# Patient Record
Sex: MALE | Race: WHITE
[De-identification: names, ages, dates, MRNs, and addresses within clinical notes are randomized per-mention and may not be internally consistent; named-entity substitution may affect disease eponyms.]

---

## 2017-10-06 ENCOUNTER — HOSPITAL ENCOUNTER (OUTPATIENT)
Dept: HOSPITAL 6 - LAB | Age: 77
End: 2017-10-06
Attending: NURSE PRACTITIONER
Payer: MEDICARE

## 2017-10-06 DIAGNOSIS — N30.01: Primary | ICD-10-CM

## 2017-10-07 ENCOUNTER — HOSPITAL ENCOUNTER (OUTPATIENT)
Dept: HOSPITAL 6 - AMSURD | Age: 77
LOS: 6 days | Discharge: HOME | End: 2017-10-13
Attending: NURSE PRACTITIONER
Payer: MEDICARE

## 2017-10-07 VITALS — WEIGHT: 295.64 LBS | BODY MASS INDEX: 44.81 KG/M2 | HEIGHT: 67.99 IN

## 2017-10-07 VITALS — SYSTOLIC BLOOD PRESSURE: 143 MMHG | DIASTOLIC BLOOD PRESSURE: 60 MMHG

## 2017-10-07 DIAGNOSIS — N30.91: Primary | ICD-10-CM

## 2017-10-08 VITALS — DIASTOLIC BLOOD PRESSURE: 81 MMHG | SYSTOLIC BLOOD PRESSURE: 166 MMHG

## 2017-10-09 VITALS — SYSTOLIC BLOOD PRESSURE: 137 MMHG | DIASTOLIC BLOOD PRESSURE: 76 MMHG

## 2017-10-10 VITALS — DIASTOLIC BLOOD PRESSURE: 67 MMHG | SYSTOLIC BLOOD PRESSURE: 151 MMHG

## 2017-10-11 VITALS — SYSTOLIC BLOOD PRESSURE: 148 MMHG | DIASTOLIC BLOOD PRESSURE: 80 MMHG

## 2017-10-12 VITALS — DIASTOLIC BLOOD PRESSURE: 67 MMHG | SYSTOLIC BLOOD PRESSURE: 130 MMHG

## 2017-10-13 ENCOUNTER — HOSPITAL ENCOUNTER (OUTPATIENT)
Dept: HOSPITAL 6 - LAB | Age: 77
End: 2017-10-13
Attending: PHYSICIAN ASSISTANT
Payer: MEDICARE

## 2017-10-13 VITALS
DIASTOLIC BLOOD PRESSURE: 60 MMHG | DIASTOLIC BLOOD PRESSURE: 60 MMHG | DIASTOLIC BLOOD PRESSURE: 60 MMHG | SYSTOLIC BLOOD PRESSURE: 148 MMHG | SYSTOLIC BLOOD PRESSURE: 148 MMHG | SYSTOLIC BLOOD PRESSURE: 148 MMHG

## 2017-10-13 VITALS
DIASTOLIC BLOOD PRESSURE: 56 MMHG | SYSTOLIC BLOOD PRESSURE: 122 MMHG | SYSTOLIC BLOOD PRESSURE: 122 MMHG | DIASTOLIC BLOOD PRESSURE: 56 MMHG

## 2017-10-13 DIAGNOSIS — N30.90: Primary | ICD-10-CM

## 2017-10-13 NOTE — NUR
At 1640 Pt here for out patient IM rocephin 2gm. Oxygen on room air 85-86% on
room air. Pt states he has oxygen he wears 3L/NC. Oxygen applied to pt at
3L/NC. Stated he felt slightly SOB. Sa02 increased to 92-95%. Walks with
walker, gait steady. A/o x 3.
1700 Pt and wife asked if he had a follow appt with Ghazala KELSEY. Clinic call. Order received for UA and office will call pt on monday
with results and for any follow up appt.
1709 UA received, sent to lab.

## 2017-11-03 ENCOUNTER — HOSPITAL ENCOUNTER (OUTPATIENT)
Dept: HOSPITAL 6 - RAD | Age: 77
End: 2017-11-03
Attending: FAMILY MEDICINE
Payer: MEDICARE

## 2017-11-03 DIAGNOSIS — I27.20: ICD-10-CM

## 2017-11-03 DIAGNOSIS — Z47.89: ICD-10-CM

## 2017-11-03 DIAGNOSIS — I10: ICD-10-CM

## 2017-11-03 DIAGNOSIS — Z87.81: ICD-10-CM

## 2017-11-03 DIAGNOSIS — I35.8: ICD-10-CM

## 2017-11-03 DIAGNOSIS — N31.9: Primary | ICD-10-CM

## 2017-11-03 DIAGNOSIS — E11.40: ICD-10-CM

## 2017-11-03 DIAGNOSIS — J44.9: ICD-10-CM

## 2017-11-03 DIAGNOSIS — M25.572: ICD-10-CM

## 2017-11-03 DIAGNOSIS — R93.7: ICD-10-CM

## 2017-11-03 LAB
APPEARANCE UR: CLEAR
BILIRUB UR QL STRIP.AUTO: NEGATIVE
COLOR UR AUTO: YELLOW
GLUCOSE UR QL STRIP.AUTO: NEGATIVE MG/DL
KETONES UR QL STRIP.AUTO: NEGATIVE
LEUKOCYTE ESTERASE UR QL STRIP: NEGATIVE
NITRITE UR QL STRIP: NEGATIVE
PH UR STRIP.AUTO: 5 [PH] (ref 5–8)
PROT ?TM UR-MCNC: NEGATIVE MG/DL
RBC UR QL AUTO: NEGATIVE
SP GR UR STRIP.AUTO: 1.01 (ref 1–1.03)
UROBILINOGEN UR-MCNC: NORMAL MG/DL
WBC #/AREA URNS HPF: (no result) /HPF (ref 0–3)

## 2017-12-14 ENCOUNTER — HOSPITAL ENCOUNTER (OUTPATIENT)
Dept: HOSPITAL 6 - LAB | Age: 77
End: 2017-12-14
Payer: MEDICARE

## 2017-12-14 DIAGNOSIS — M25.572: Primary | ICD-10-CM

## 2017-12-14 LAB
BASOPHILS # BLD: 0.1 10*3/UL (ref 0.02–0.1)
EOSINOPHIL # BLD: 0.3 10*3/UL (ref 0.04–0.4)
EOSINOPHIL NFR BLD: 3.8 % (ref 0–4)
ERYTHROCYTE [DISTWIDTH] IN BLOOD BY AUTOMATED COUNT: 15 % (ref 11.5–14.5)
ERYTHROCYTE [SEDIMENTATION RATE] IN BLOOD: 23 MM/HR (ref 0–20)
HCT VFR BLD AUTO: 44.6 % (ref 42–52)
HGB BLD-MCNC: 13.8 G/DL (ref 13.5–18)
LYMPHOCYTES # BLD: 2.1 10*3/UL (ref 1.5–4)
MCH RBC QN AUTO: 31 PG (ref 27–31)
MCHC RBC AUTO-ENTMCNC: 31 G/DL (ref 33–37)
MCV RBC AUTO: 99 FL (ref 78–100)
MONOCYTES # BLD: 0.6 10*3/UL (ref 0.2–0.8)
NEUTROPHILS # BLD: 3.5 10*3/UL (ref 1.4–6.5)
PLATELET # BLD AUTO: 219 K/MM3 (ref 130–400)
PMV BLD AUTO: 9.7 FL (ref 7.4–10.4)
RBC # BLD AUTO: 4.53 M/MM3 (ref 4.2–5.6)
WBC # BLD AUTO: 6.6 K/MM3 (ref 4.8–10.8)

## 2018-01-04 ENCOUNTER — HOSPITAL ENCOUNTER (OUTPATIENT)
Dept: HOSPITAL 6 - ED | Age: 78
Setting detail: OBSERVATION
LOS: 2 days | Discharge: HOME HEALTH | End: 2018-01-06
Attending: FAMILY MEDICINE | Admitting: FAMILY MEDICINE
Payer: MEDICARE

## 2018-01-04 VITALS — SYSTOLIC BLOOD PRESSURE: 141 MMHG | DIASTOLIC BLOOD PRESSURE: 80 MMHG

## 2018-01-04 VITALS — DIASTOLIC BLOOD PRESSURE: 71 MMHG | SYSTOLIC BLOOD PRESSURE: 132 MMHG

## 2018-01-04 VITALS — HEIGHT: 67.99 IN | WEIGHT: 287.92 LBS | BODY MASS INDEX: 43.64 KG/M2

## 2018-01-04 DIAGNOSIS — E11.40: ICD-10-CM

## 2018-01-04 DIAGNOSIS — Z79.01: ICD-10-CM

## 2018-01-04 DIAGNOSIS — E11.22: ICD-10-CM

## 2018-01-04 DIAGNOSIS — Z88.8: ICD-10-CM

## 2018-01-04 DIAGNOSIS — G47.30: ICD-10-CM

## 2018-01-04 DIAGNOSIS — R01.1: ICD-10-CM

## 2018-01-04 DIAGNOSIS — I27.20: ICD-10-CM

## 2018-01-04 DIAGNOSIS — E66.01: ICD-10-CM

## 2018-01-04 DIAGNOSIS — Z79.82: ICD-10-CM

## 2018-01-04 DIAGNOSIS — Z88.5: ICD-10-CM

## 2018-01-04 DIAGNOSIS — Z91.81: ICD-10-CM

## 2018-01-04 DIAGNOSIS — I12.9: ICD-10-CM

## 2018-01-04 DIAGNOSIS — N17.9: Primary | ICD-10-CM

## 2018-01-04 DIAGNOSIS — R09.02: ICD-10-CM

## 2018-01-04 DIAGNOSIS — N18.9: ICD-10-CM

## 2018-01-04 DIAGNOSIS — Z99.81: ICD-10-CM

## 2018-01-04 DIAGNOSIS — E11.649: ICD-10-CM

## 2018-01-04 DIAGNOSIS — Z79.84: ICD-10-CM

## 2018-01-04 DIAGNOSIS — Z79.02: ICD-10-CM

## 2018-01-04 DIAGNOSIS — I35.0: ICD-10-CM

## 2018-01-04 DIAGNOSIS — N31.9: ICD-10-CM

## 2018-01-04 DIAGNOSIS — T38.3X5A: ICD-10-CM

## 2018-01-04 DIAGNOSIS — N39.498: ICD-10-CM

## 2018-01-04 DIAGNOSIS — J44.9: ICD-10-CM

## 2018-01-04 DIAGNOSIS — N13.9: ICD-10-CM

## 2018-01-04 DIAGNOSIS — Z87.891: ICD-10-CM

## 2018-01-04 LAB
ALBUMIN SERPL-MCNC: 3.3 G/DL (ref 3.5–5)
ALT SERPL-CCNC: 32 U/L (ref 21–72)
APPEARANCE UR: CLEAR
AST SERPL-CCNC: 21 U/L (ref 17–59)
BASOPHILS # BLD: 0 10*3/UL (ref 0.02–0.1)
BILIRUB SERPL-MCNC: 0.8 MG/DL (ref 0.2–1.3)
BILIRUB UR QL STRIP.AUTO: NEGATIVE
BUN/CREAT SERPL: 18 (ref 6–26)
CALCIUM SERPL-MCNC: 9.1 MG/DL (ref 8.4–10.2)
CO2 SERPL-SCNC: 29 MMOL/L (ref 22–30)
COLOR UR AUTO: YELLOW
EOSINOPHIL # BLD: 0.3 10*3/UL (ref 0.04–0.4)
EOSINOPHIL NFR BLD: 3.8 % (ref 0–4)
ERYTHROCYTE [DISTWIDTH] IN BLOOD BY AUTOMATED COUNT: 14.9 % (ref 11.5–14.5)
GLUCOSE SERPL-MCNC: 49 MG/DL (ref 75–110)
GLUCOSE UR QL STRIP.AUTO: NEGATIVE MG/DL
HCT VFR BLD AUTO: 41.1 % (ref 42–52)
HGB BLD-MCNC: 12.5 G/DL (ref 13.5–18)
KETONES UR QL STRIP.AUTO: NEGATIVE
LEUKOCYTE ESTERASE UR QL STRIP: NEGATIVE
LYMPHOCYTES # BLD: 1.7 10*3/UL (ref 1.5–4)
MCH RBC QN AUTO: 30 PG (ref 27–31)
MCHC RBC AUTO-ENTMCNC: 30 G/DL (ref 33–37)
MCV RBC AUTO: 98 FL (ref 78–100)
MONOCYTES # BLD: 0.7 10*3/UL (ref 0.2–0.8)
NEUTROPHILS # BLD: 4 10*3/UL (ref 1.4–6.5)
NITRITE UR QL STRIP: NEGATIVE
PH UR STRIP.AUTO: 5 [PH] (ref 5–8)
PLATELET # BLD AUTO: 200 K/MM3 (ref 130–400)
PMV BLD AUTO: 9.7 FL (ref 7.4–10.4)
POTASSIUM SERPL-SCNC: 5 MMOL/L (ref 3.6–5)
PROT ?TM UR-MCNC: (no result) MG/DL
PROT SERPL-MCNC: 6.4 G/DL (ref 6.3–8.2)
RBC # BLD AUTO: 4.18 M/MM3 (ref 4.2–5.6)
RBC UR QL AUTO: NEGATIVE
SODIUM SERPL-SCNC: 140 MMOL/L (ref 137–145)
SP GR UR STRIP.AUTO: 1.01 (ref 1–1.03)
UROBILINOGEN UR-MCNC: NORMAL MG/DL
WBC # BLD AUTO: 6.7 K/MM3 (ref 4.8–10.8)
WBC #/AREA URNS HPF: (no result) /HPF (ref 0–3)

## 2018-01-04 PROCEDURE — A4354 CATH INSERTION TRAY W/BAG: HCPCS

## 2018-01-04 PROCEDURE — G0378 HOSPITAL OBSERVATION PER HR: HCPCS

## 2018-01-04 NOTE — NUR
PATIENT ADMITTED FROM THE ER VIA WHEELCHAIR AT THIS TIME FOR GENERAL WEAKNESS
AND FALLS AT HOME, WAS HYPOGLYCEMIC IN THE ER WITH BLOOD SUGAR OF 49, WAS
GIVEN ORANGE JUICE AT THAT TIME, WIFE HAS BROUGHT IN HIS HOME MEDS AND SON IS
BRINGING HIM SOME FOOD AT THIS TIME FOR SUPPER, PATIENT IS ABLE TO TRANSFER
SELF FROM THE WHEELCHAIR INTO THE CHAIR WITH STANDBY ASSIST, PARTIAL
ASSESSMENT PERFORMED BEFORE PATIENTS FOOD ARRIVED, PATIENT ALERT AND ORIENTED,
RESP EVEN/UNLABORD, DENIES ANY PAIN, NAUSEA OR VOMITING, IS NOT LIGHTHEADED OR
DIZZY AT THIS TIME, TRAY SET UP FOR PATIENT TO ALLOW HIM TO EAT AT THIS TIME
AND WILL RESUME FULL ASSESSMENT IN A LITTLE WHILE, PATIENTS WIFE AND SON AT
BEDSIDE

## 2018-01-04 NOTE — NUR
PATIENT WAS ABLE TO EAT WITHOUT DIFFICULTY, IS CHANGED INTO A GOWN AND FULL
SKIN ASSESSMENT PERFORMED AT THIS TIME, TELEMETRY APPLIED, NORMAL SINUS
RHYTHM, 1+ EDEMA NOTED TO BILATERAL LOWER LEGS, HAS TREY HOSE ON, LUNGS CLEAR,
BOWEL SOUNDS ACTIVE, PULSES STRONG IN ALL 4 EXTREMITIES, HEART REGULAR,
PATIENT CLAIMS HE FEELS BETTER NOW THEN HE HAS IN DAYS, EDUCATION PROVIDED ON
S/S OF HYPOGLYCEMIA, THE NEED TO CHECK BLOOD SUGARS ESPECIALLY WHEN FEELING
LIGHTHEADED/WEAK OR DIZZY, PATIENT THINKS HE HAS A GLUCOMETER AT HOME BUT NOT
SURE IF HE HAS ANY STRIPS, WILL PASS THAT INFORMATION ON TO MAKE SURE HE HAS
ADEQUATE SUPPLIES BEFORE GOING HOME, EDUCATION PROVIDED ON NORMAL AND ABNORMAL
BLOOD SUGAR RANGES AND S/S OF ABNORMAL SUGARS, FURTHER EDUCATION WILL BE
NEEDED

## 2018-01-05 VITALS — SYSTOLIC BLOOD PRESSURE: 159 MMHG | DIASTOLIC BLOOD PRESSURE: 79 MMHG

## 2018-01-05 VITALS — SYSTOLIC BLOOD PRESSURE: 138 MMHG | DIASTOLIC BLOOD PRESSURE: 68 MMHG

## 2018-01-05 VITALS — SYSTOLIC BLOOD PRESSURE: 130 MMHG | DIASTOLIC BLOOD PRESSURE: 60 MMHG

## 2018-01-05 VITALS — SYSTOLIC BLOOD PRESSURE: 155 MMHG | DIASTOLIC BLOOD PRESSURE: 71 MMHG

## 2018-01-05 VITALS — SYSTOLIC BLOOD PRESSURE: 127 MMHG | DIASTOLIC BLOOD PRESSURE: 59 MMHG

## 2018-01-05 VITALS — DIASTOLIC BLOOD PRESSURE: 69 MMHG | SYSTOLIC BLOOD PRESSURE: 128 MMHG

## 2018-01-05 VITALS — SYSTOLIC BLOOD PRESSURE: 129 MMHG | DIASTOLIC BLOOD PRESSURE: 73 MMHG

## 2018-01-05 LAB
BUN/CREAT SERPL: 18.4 (ref 6–26)
CALCIUM SERPL-MCNC: 8.9 MG/DL (ref 8.4–10.2)
CO2 SERPL-SCNC: 29 MMOL/L (ref 22–30)
D DIMER PPP FEU-MCNC: 1.83 MG/L FEU (ref 0.15–0.5)
GLUCOSE SERPL-MCNC: 103 MG/DL (ref 75–110)
POTASSIUM SERPL-SCNC: 5.2 MMOL/L (ref 3.6–5)
SODIUM SERPL-SCNC: 141 MMOL/L (ref 137–145)

## 2018-01-05 NOTE — NUR
Pt bladder scanned by day shift for 891 ml at 1830 . Pt then voided approx
100ml. Pt then rebladder scanned by day shift at 1859. Bladder scan ranged
from 681-783.  Dr Hess informed at approx 2000 post void residual.  Received
orders to straight cath pt. This RN then straight cathed pt for 1050ml. Pt
tolerated well.  inform of amount of urine. Pt states he now feels less full
and he will be able to sleep better tonight.

## 2018-01-05 NOTE — NUR
PATIENT TRANSPORTED TO RADIOLOGY VIA WHEELCHAIR AT THIS TIME FOR THYROID
ULTRASOUND AND CAROTID DOPPLARS

## 2018-01-05 NOTE — NUR
Pt and family have chosen [facility] for their HH services.
[facility] staff notified of pt's possible d/c tomorrow and they will contact
pt tomorrow (if accepted).

## 2018-01-05 NOTE — NUR
REPORT RECEIVED FROM YUDI BAILEY AND BEDSIDE ROUNDING DONE. WILL TALK TO DR
ABOUT BLADDER SCAN RESULTS. PT DENIES ANY OTHER NEEDS AT THIS TIME.

## 2018-01-05 NOTE — NUR
PATIENT SITTING UP IN RECLINER. SHIFT ASSESSMENT COMPLETE. PATIENT ALERT AND
ORIENTED X4. REPORTS HAVING PAIN TO RIGHT SHOULDER THAT IS INTERMITTENT.
REPORTS PAIN GETS UP TO 9/10 BUT ONCE HE MOVES IT A CERTAIN WAY PAIN IMPROVES.
REPORTS THAT PAIN IS CHRONIC AND DENIES ANY NEED FOR INTERVENTION AT THIS
TIME. PATIENT ON OXYGEN VIA NASAL CANNULA AT 4L. WHEN ASKED IF HE IS
EXPERIENCING SHORTNESS OF BREATH STATES "NOT TOO BAD" REPORTS THAT HE FEELS AS
THOUGH HE IS BREATHING AT HIS BASELINE. REPORTS FEELING BETTER THIS MORNING
AND NOT AS WEAK. PATIENT'S CALL LIGHT WITHIN REACH. CHAIR ALARM ON.

## 2018-01-05 NOTE — NUR
Pt aware of tranfer per Dr. Little's order. Spoke w/ pt regarding SWB services
offered here at [facility] should he need further therapy after stay at [facility] and of the continued availability of HHS following hospitalization.

## 2018-01-05 NOTE — NUR
PATIENT'S SP02 88% ON 3L O2 NASAL CANNULA. PATIENT REPORTS THAT BREATHING
FEELS "NOT TOO BAD" PATIENT'S OXYGEN INCREASED TO 4L NC. SP02 UP TO 89-90%.
PATIENT REPORTS THAT IT'S NORMAL FOR HIM TO RUN IN THE 80'S.

## 2018-01-05 NOTE — NUR
Spoke w/ pt and son regarding HH options upon discharge versus outpt therapy
here at hospital.  Release of info sheet and HH options sheet presented to
pt/son and they prefer to discuss with pt's wife prior to making any
decisions. Pt verbalizes he would prefer in home therapies initially.  Family
will try to decide by this evening.  Family also understands that PCP's office
can set up HH if decision cannot be made by time of d/c.

## 2018-01-06 VITALS
SYSTOLIC BLOOD PRESSURE: 141 MMHG | SYSTOLIC BLOOD PRESSURE: 141 MMHG | DIASTOLIC BLOOD PRESSURE: 72 MMHG | DIASTOLIC BLOOD PRESSURE: 72 MMHG | SYSTOLIC BLOOD PRESSURE: 141 MMHG | SYSTOLIC BLOOD PRESSURE: 141 MMHG | DIASTOLIC BLOOD PRESSURE: 72 MMHG | DIASTOLIC BLOOD PRESSURE: 72 MMHG

## 2018-01-06 VITALS — SYSTOLIC BLOOD PRESSURE: 130 MMHG | DIASTOLIC BLOOD PRESSURE: 71 MMHG

## 2018-01-06 VITALS — SYSTOLIC BLOOD PRESSURE: 134 MMHG | DIASTOLIC BLOOD PRESSURE: 73 MMHG

## 2018-01-06 VITALS — DIASTOLIC BLOOD PRESSURE: 74 MMHG | SYSTOLIC BLOOD PRESSURE: 146 MMHG

## 2018-01-06 LAB
BASOPHILS # BLD: 0.1 10*3/UL (ref 0.02–0.1)
BUN/CREAT SERPL: 19.1 (ref 6–26)
CALCIUM SERPL-MCNC: 9 MG/DL (ref 8.4–10.2)
CO2 SERPL-SCNC: 32 MMOL/L (ref 22–30)
EOSINOPHIL # BLD: 0.3 10*3/UL (ref 0.04–0.4)
EOSINOPHIL NFR BLD: 5.1 % (ref 0–4)
ERYTHROCYTE [DISTWIDTH] IN BLOOD BY AUTOMATED COUNT: 15 % (ref 11.5–14.5)
GLUCOSE SERPL-MCNC: 149 MG/DL (ref 75–110)
HCT VFR BLD AUTO: 42.3 % (ref 42–52)
HGB BLD-MCNC: 12.9 G/DL (ref 13.5–18)
LYMPHOCYTES # BLD: 1.3 10*3/UL (ref 1.5–4)
MCH RBC QN AUTO: 30 PG (ref 27–31)
MCHC RBC AUTO-ENTMCNC: 31 G/DL (ref 33–37)
MCV RBC AUTO: 98 FL (ref 78–100)
MONOCYTES # BLD: 0.6 10*3/UL (ref 0.2–0.8)
NEUTROPHILS # BLD: 3.6 10*3/UL (ref 1.4–6.5)
PLATELET # BLD AUTO: 218 K/MM3 (ref 130–400)
PMV BLD AUTO: 9.4 FL (ref 7.4–10.4)
POTASSIUM SERPL-SCNC: 5 MMOL/L (ref 3.6–5)
RBC # BLD AUTO: 4.33 M/MM3 (ref 4.2–5.6)
SODIUM SERPL-SCNC: 141 MMOL/L (ref 137–145)
WBC # BLD AUTO: 5.8 K/MM3 (ref 4.8–10.8)

## 2018-01-06 NOTE — NUR
PATIENT PROVIDED DISCHARGE INSTRUCTIONS PATIENT'S WIFE IN ROOM. THIS NURSE
SPENT AT LEAST     1 HOUR IN ROOM WITH  PATIENT AND WIFE PROVIDING EDUCATION
AND GOING OVER DISCHARGE INFORMATION. THIS NURSE PROVIDED THOROUGH EDUCATION
ON ZULETA CATHETER CARE AT HOME ESPECIALLY ON INFECTION PREVENTION.
DEMONSTRATED TO BOTH PATIENT AND WIFE HOW TO CHANGE DEPENDENT BAG TO LEG BAG.
VERBALIZED UNDERSTANDING OF INSTRUCTIONS. DENIED ANY QUESTIONS REGARDING HOME
CATH CARE AT THIS TIME. PATIENT SENT HOME WITH LEG BAG,EXTRA STAT LOCK,EXTRA
ZULETA CATHETER BAG. WENT OVER PATIENT'S MEDICATIONS AND LIST. PROVIDED PATIENT
WITH COPIES OF LABS, EKG,AND       HEALTH HISTORY PACKET TO TAKE TO DR.KATZ TAYLOR. DENIES ANY QUESTIONS FOR THE NURSE AT THIS TIME.

## 2018-01-06 NOTE — NUR
0715 Bladder Scan done. Scan showed 979mls. Dr Hess called notified. Order
received for indwelling catheter to dependant drainage. 0740 Peng catheter 16
Urdu placed. Stat lock placed on pt's right upper inner thigh.

## 2018-01-06 NOTE — NUR
PATIENT'S SHIFT ASSESSMENT COMPLETE. PATIENT ALERT AND ORIENTED X4. DENIES ANY
PAIN OR DISCOMFORTS AT THIS TIME. REPORTS THAT HE HAD A GOOD NIGHT LASTNIGHT.
WHEN ASKED HOW HE IS FEELING THIS MORNING STATES "PRETTY GOOD ACTUALLY, I FEEL
LIKE I'M GETTING STRENGTH BACK" REPORTS HIS BREATHING FEELS "NOT TOO BAD" ON
OXYGEN VIA NASAL CANNULA AT 3L. PATIENT HAS +2 EDEMA TO BILAT LOWER EXT.
PATIENT'S RIGHT LEG MORE EDEMATOUS THIS MORNING THAN YESTERDAY. PATIENT'S
ABDOMEN ROUNDED. ABDOMEN NOT AS DISTENDED AFTER ZULETA CATHETER PLACEMENT.
PATIENT'S CALL LIGHT WITHIN REACH. BED ALARM ON.

## 2018-01-06 NOTE — NUR
PATIENT LEFT FACILITY VIA WHEELCHAIR TO POV ACCOMPANIED BY THIS NURSE LAUREANO
AND PATIENT'S WIFE. PERSONAL BELONGINGS AND DISCHARGE PAPERWORK SENT WITH
PATIENT'S WIFE. PATIENT'S DISCHARGE INSTRUCTIONS FAXED TO [facility]
PER INSTRUCTIONS ALSO CALLED AND NOTIFIED OF PATIENT'S DISCHARGE TIME AND THAT
PATIENT HAS APPT WITH [facility] MONDAY MORNING SO THEY WILL NEED TO COME
SOMETIME AFTER APPT TO DO PATIENT'S HOME HEALTH ADMISSION.

## 2018-01-06 NOTE — NUR
MONI DIETICIAN IN WITH PATIENT AND WIFE TO SET UP NEW ACCU CHECK MONITOR AND
TO EDUCATE ON HOW TO USE AND MAKE SURE PATIENT IS ABLE TO USE AT HOME. PATIENT
PROVIDED EDUCATION ON DIABETIES DIET AND NORMAL BLOOD SUGAR RANGES.

## 2018-01-06 NOTE — NUR
CONTACTED [facility] AND NOTIFIED THEM THAT PATIENT IS BEING
DISCHARGED TODAY. [facility] WAS NOT PLANNING ON DOING PATIENT'S ADMISSION
UNTIL MONDAY THEY ASKED IF PATIENT WOULD BE OKAY WITHOUT HOME HEALTH UNTIL
MONDAY. REQUESTED THAT THEY COME SUNDAY. HOME HEALTH NURSE PROVIDED WEEKEND ON
CALL NUMBER TO GIVE TO PATIENT AND WANTED PATIENT TO CALL THAT NUMBER IF THEY
NEEDED ANYTHING AT ALL OVER THE WEEKEND. PROVIDED NUMBER TO PATIENT AND WIFE.

## 2018-01-06 NOTE — NUR
Q hourly checks done. Bed alarm set. C-pap on. [facility] reported pt's Sa02
83%-87%. Pt opens eyes when spoken too. Denies SOB, chest pain or abd pain. I
asked pt to take deep breaths through mouth with c-pap nose piece on. Sa02
increased to 91%.

## 2018-01-10 ENCOUNTER — HOSPITAL ENCOUNTER (OUTPATIENT)
Dept: HOSPITAL 6 - LAB | Age: 78
End: 2018-01-10
Attending: FAMILY MEDICINE
Payer: MEDICARE

## 2018-01-10 DIAGNOSIS — R30.0: ICD-10-CM

## 2018-01-10 DIAGNOSIS — R50.9: Primary | ICD-10-CM

## 2018-01-10 LAB
APPEARANCE UR: (no result)
BILIRUB UR QL STRIP.AUTO: NEGATIVE
BUN/CREAT SERPL: 19.4 (ref 6–26)
CALCIUM SERPL-MCNC: 9.3 MG/DL (ref 8.4–10.2)
CO2 SERPL-SCNC: 31 MMOL/L (ref 22–30)
COLOR UR AUTO: YELLOW
GLUCOSE SERPL-MCNC: 204 MG/DL (ref 75–110)
GLUCOSE UR QL STRIP.AUTO: NEGATIVE MG/DL
KETONES UR QL STRIP.AUTO: NEGATIVE
LEUKOCYTE ESTERASE UR QL STRIP: (no result)
NITRITE UR QL STRIP: POSITIVE
PH UR STRIP.AUTO: 5 [PH] (ref 5–8)
POTASSIUM SERPL-SCNC: 5 MMOL/L (ref 3.6–5)
PROT ?TM UR-MCNC: (no result) MG/DL
RBC UR QL AUTO: (no result)
SODIUM SERPL-SCNC: 135 MMOL/L (ref 137–145)
SP GR UR STRIP.AUTO: 1.01 (ref 1–1.03)
UROBILINOGEN UR-MCNC: NORMAL MG/DL
WBC #/AREA URNS HPF: (no result) /HPF (ref 0–3)

## 2018-01-15 ENCOUNTER — HOSPITAL ENCOUNTER (OUTPATIENT)
Dept: HOSPITAL 6 - LAB | Age: 78
End: 2018-01-15
Attending: FAMILY MEDICINE
Payer: MEDICARE

## 2018-01-15 DIAGNOSIS — R33.8: ICD-10-CM

## 2018-01-15 DIAGNOSIS — J44.9: ICD-10-CM

## 2018-01-15 DIAGNOSIS — E13.9: ICD-10-CM

## 2018-01-15 DIAGNOSIS — I10: ICD-10-CM

## 2018-01-15 DIAGNOSIS — N39.0: ICD-10-CM

## 2018-01-15 DIAGNOSIS — N31.9: Primary | ICD-10-CM

## 2018-01-15 LAB
APPEARANCE UR: (no result)
BILIRUB UR QL STRIP.AUTO: NEGATIVE
COLOR UR AUTO: YELLOW
GLUCOSE UR QL STRIP.AUTO: NEGATIVE MG/DL
KETONES UR QL STRIP.AUTO: NEGATIVE
LEUKOCYTE ESTERASE UR QL STRIP: (no result)
NITRITE UR QL STRIP: NEGATIVE
PH UR STRIP.AUTO: 5 [PH] (ref 5–8)
PROT ?TM UR-MCNC: (no result) MG/DL
RBC UR QL AUTO: (no result)
SP GR UR STRIP.AUTO: 1.01 (ref 1–1.03)
UROBILINOGEN UR-MCNC: NORMAL MG/DL
WBC #/AREA URNS HPF: >50 /HPF (ref 0–3)

## 2018-01-22 ENCOUNTER — HOSPITAL ENCOUNTER (OUTPATIENT)
Dept: HOSPITAL 6 - LAB | Age: 78
End: 2018-01-22
Attending: FAMILY MEDICINE
Payer: MEDICARE

## 2018-01-22 DIAGNOSIS — R33.8: ICD-10-CM

## 2018-01-22 DIAGNOSIS — Z88.8: ICD-10-CM

## 2018-01-22 DIAGNOSIS — N39.0: Primary | ICD-10-CM

## 2018-01-22 DIAGNOSIS — Z88.5: ICD-10-CM

## 2018-01-22 LAB
APPEARANCE UR: (no result)
BILIRUB UR QL STRIP.AUTO: NEGATIVE
COLOR UR AUTO: YELLOW
GLUCOSE UR QL STRIP.AUTO: NEGATIVE MG/DL
KETONES UR QL STRIP.AUTO: NEGATIVE
LEUKOCYTE ESTERASE UR QL STRIP: NEGATIVE
NITRITE UR QL STRIP: NEGATIVE
PH UR STRIP.AUTO: 5 [PH] (ref 5–8)
PROT ?TM UR-MCNC: (no result) MG/DL
RBC UR QL AUTO: NEGATIVE
SP GR UR STRIP.AUTO: 1.02 (ref 1–1.03)
UROBILINOGEN UR-MCNC: NORMAL MG/DL
WBC #/AREA URNS HPF: (no result) /HPF (ref 0–3)

## 2018-01-25 ENCOUNTER — HOSPITAL ENCOUNTER (OUTPATIENT)
Dept: HOSPITAL 6 - LAB | Age: 78
End: 2018-01-25
Payer: MEDICARE

## 2018-01-25 VITALS
SYSTOLIC BLOOD PRESSURE: 151 MMHG | DIASTOLIC BLOOD PRESSURE: 75 MMHG | SYSTOLIC BLOOD PRESSURE: 151 MMHG | DIASTOLIC BLOOD PRESSURE: 75 MMHG | SYSTOLIC BLOOD PRESSURE: 151 MMHG | DIASTOLIC BLOOD PRESSURE: 75 MMHG | DIASTOLIC BLOOD PRESSURE: 75 MMHG | DIASTOLIC BLOOD PRESSURE: 75 MMHG | SYSTOLIC BLOOD PRESSURE: 151 MMHG | DIASTOLIC BLOOD PRESSURE: 75 MMHG | SYSTOLIC BLOOD PRESSURE: 151 MMHG | SYSTOLIC BLOOD PRESSURE: 151 MMHG | SYSTOLIC BLOOD PRESSURE: 151 MMHG | SYSTOLIC BLOOD PRESSURE: 151 MMHG | SYSTOLIC BLOOD PRESSURE: 151 MMHG | DIASTOLIC BLOOD PRESSURE: 75 MMHG | SYSTOLIC BLOOD PRESSURE: 151 MMHG | DIASTOLIC BLOOD PRESSURE: 75 MMHG | DIASTOLIC BLOOD PRESSURE: 75 MMHG | DIASTOLIC BLOOD PRESSURE: 75 MMHG | SYSTOLIC BLOOD PRESSURE: 151 MMHG | SYSTOLIC BLOOD PRESSURE: 151 MMHG | SYSTOLIC BLOOD PRESSURE: 151 MMHG | DIASTOLIC BLOOD PRESSURE: 75 MMHG | DIASTOLIC BLOOD PRESSURE: 75 MMHG | DIASTOLIC BLOOD PRESSURE: 75 MMHG

## 2018-01-25 VITALS — BODY MASS INDEX: 43.64 KG/M2 | HEIGHT: 67.99 IN | WEIGHT: 287.92 LBS

## 2018-01-25 DIAGNOSIS — N39.0: Primary | ICD-10-CM

## 2018-01-25 LAB
APPEARANCE UR: (no result)
BILIRUB UR QL STRIP.AUTO: NEGATIVE
COLOR UR AUTO: YELLOW
GLUCOSE UR QL STRIP.AUTO: NEGATIVE MG/DL
KETONES UR QL STRIP.AUTO: NEGATIVE
LEUKOCYTE ESTERASE UR QL STRIP: (no result)
NITRITE UR QL STRIP: NEGATIVE
PH UR STRIP.AUTO: 6 [PH] (ref 5–8)
PROT ?TM UR-MCNC: (no result) MG/DL
RBC UR QL AUTO: (no result)
SP GR UR STRIP.AUTO: 1.02 (ref 1–1.03)
UROBILINOGEN UR-MCNC: NORMAL MG/DL
WBC #/AREA URNS HPF: >50 /HPF (ref 0–3)

## 2018-01-25 PROCEDURE — A4344 CATH INDW FOLEY 2 WAY SILICN: HCPCS

## 2018-01-25 PROCEDURE — A4354 CATH INSERTION TRAY W/BAG: HCPCS

## 2018-01-25 NOTE — NUR
straight cath completed using #12 perry for collection of UA and
Culture,ordered per Dr. Gupta. Spec to lab, patient home

## 2018-02-01 ENCOUNTER — HOSPITAL ENCOUNTER (OUTPATIENT)
Dept: HOSPITAL 6 - LAB | Age: 78
End: 2018-02-01
Attending: FAMILY MEDICINE
Payer: MEDICARE

## 2018-02-01 DIAGNOSIS — Z88.5: ICD-10-CM

## 2018-02-01 DIAGNOSIS — N31.9: Primary | ICD-10-CM

## 2018-02-01 DIAGNOSIS — E11.9: ICD-10-CM

## 2018-02-01 LAB
APPEARANCE UR: CLEAR
BILIRUB UR QL STRIP.AUTO: NEGATIVE
COLOR UR AUTO: YELLOW
GLUCOSE UR QL STRIP.AUTO: NEGATIVE MG/DL
KETONES UR QL STRIP.AUTO: NEGATIVE
LEUKOCYTE ESTERASE UR QL STRIP: NEGATIVE
NITRITE UR QL STRIP: NEGATIVE
PH UR STRIP.AUTO: 5 [PH] (ref 5–8)
PROT ?TM UR-MCNC: (no result) MG/DL
RBC UR QL AUTO: NEGATIVE
SP GR UR STRIP.AUTO: 1.01 (ref 1–1.03)
UROBILINOGEN UR-MCNC: NORMAL MG/DL
WBC #/AREA URNS HPF: (no result) /HPF (ref 0–3)

## 2018-02-13 ENCOUNTER — HOSPITAL ENCOUNTER (OUTPATIENT)
Dept: HOSPITAL 6 - LAB | Age: 78
End: 2018-02-13
Attending: FAMILY MEDICINE
Payer: MEDICARE

## 2018-02-13 DIAGNOSIS — N31.9: ICD-10-CM

## 2018-02-13 DIAGNOSIS — R50.9: ICD-10-CM

## 2018-02-13 DIAGNOSIS — N39.0: Primary | ICD-10-CM

## 2018-02-13 DIAGNOSIS — R30.0: ICD-10-CM

## 2018-02-19 ENCOUNTER — HOSPITAL ENCOUNTER (OUTPATIENT)
Dept: HOSPITAL 6 - LAB | Age: 78
End: 2018-02-19
Attending: FAMILY MEDICINE
Payer: MEDICARE

## 2018-02-19 DIAGNOSIS — L20.9: ICD-10-CM

## 2018-02-19 DIAGNOSIS — J06.9: ICD-10-CM

## 2018-02-19 DIAGNOSIS — R60.0: Primary | ICD-10-CM

## 2018-02-19 DIAGNOSIS — Z88.5: ICD-10-CM

## 2018-02-19 LAB
ALBUMIN SERPL-MCNC: 3.8 G/DL (ref 3.5–5)
ALT SERPL-CCNC: 33 U/L (ref 21–72)
AST SERPL-CCNC: 25 U/L (ref 17–59)
BASOPHILS # BLD: 0 10*3/UL (ref 0.02–0.1)
BILIRUB SERPL-MCNC: 0.3 MG/DL (ref 0.2–1.3)
BUN/CREAT SERPL: 16.7 (ref 6–26)
CALCIUM SERPL-MCNC: 9.1 MG/DL (ref 8.4–10.2)
CO2 SERPL-SCNC: 32 MMOL/L (ref 22–30)
EOSINOPHIL # BLD: 0.2 10*3/UL (ref 0.04–0.4)
EOSINOPHIL NFR BLD: 2.4 % (ref 0–4)
ERYTHROCYTE [DISTWIDTH] IN BLOOD BY AUTOMATED COUNT: 14.2 % (ref 11.5–14.5)
GLUCOSE SERPL-MCNC: 172 MG/DL (ref 75–110)
HCT VFR BLD AUTO: 41 % (ref 42–52)
HGB BLD-MCNC: 12.8 G/DL (ref 13.5–18)
LYMPHOCYTES # BLD: 1.3 10*3/UL (ref 1.5–4)
MCH RBC QN AUTO: 29 PG (ref 27–31)
MCHC RBC AUTO-ENTMCNC: 31 G/DL (ref 33–37)
MCV RBC AUTO: 93 FL (ref 78–100)
MONOCYTES # BLD: 0.7 10*3/UL (ref 0.2–0.8)
NEUTROPHILS # BLD: 5 10*3/UL (ref 1.4–6.5)
PLATELET # BLD AUTO: 272 K/MM3 (ref 130–400)
PMV BLD AUTO: 9.2 FL (ref 7.4–10.4)
POTASSIUM SERPL-SCNC: 5 MMOL/L (ref 3.6–5)
PROT SERPL-MCNC: 7.4 G/DL (ref 6.3–8.2)
RBC # BLD AUTO: 4.41 M/MM3 (ref 4.2–5.6)
SODIUM SERPL-SCNC: 136 MMOL/L (ref 137–145)
WBC # BLD AUTO: 7.2 K/MM3 (ref 4.8–10.8)

## 2018-02-22 ENCOUNTER — HOSPITAL ENCOUNTER (OUTPATIENT)
Dept: HOSPITAL 6 - LAB | Age: 78
End: 2018-02-22
Attending: FAMILY MEDICINE
Payer: MEDICARE

## 2018-02-22 DIAGNOSIS — R60.0: Primary | ICD-10-CM

## 2018-02-22 DIAGNOSIS — J06.9: ICD-10-CM

## 2018-02-22 DIAGNOSIS — L20.9: ICD-10-CM

## 2018-02-22 LAB
APPEARANCE UR: CLEAR
BILIRUB UR QL STRIP.AUTO: NEGATIVE
COLOR UR AUTO: YELLOW
GLUCOSE UR QL STRIP.AUTO: NEGATIVE MG/DL
KETONES UR QL STRIP.AUTO: NEGATIVE
LEUKOCYTE ESTERASE UR QL STRIP: (no result)
NITRITE UR QL STRIP: NEGATIVE
PH UR STRIP.AUTO: 5.5 [PH] (ref 5–8)
PROT ?TM UR-MCNC: (no result) MG/DL
RBC UR QL AUTO: NEGATIVE
SP GR UR STRIP.AUTO: 1.01 (ref 1–1.03)
UROBILINOGEN UR-MCNC: NORMAL MG/DL
WBC #/AREA URNS HPF: (no result) /HPF (ref 0–3)

## 2018-03-01 ENCOUNTER — HOSPITAL ENCOUNTER (OUTPATIENT)
Dept: HOSPITAL 6 - LAB | Age: 78
End: 2018-03-01
Attending: FAMILY MEDICINE
Payer: MEDICARE

## 2018-03-01 DIAGNOSIS — R60.0: ICD-10-CM

## 2018-03-01 DIAGNOSIS — N39.0: Primary | ICD-10-CM

## 2018-03-01 DIAGNOSIS — Z88.5: ICD-10-CM

## 2018-03-01 DIAGNOSIS — N31.9: ICD-10-CM

## 2018-03-01 LAB
APPEARANCE UR: CLEAR
BILIRUB UR QL STRIP.AUTO: NEGATIVE
BUN/CREAT SERPL: 25.6 (ref 6–26)
CALCIUM SERPL-MCNC: 9.5 MG/DL (ref 8.4–10.2)
CO2 SERPL-SCNC: 35 MMOL/L (ref 22–30)
COLOR UR AUTO: YELLOW
GLUCOSE SERPL-MCNC: 234 MG/DL (ref 75–110)
GLUCOSE UR QL STRIP.AUTO: NEGATIVE MG/DL
KETONES UR QL STRIP.AUTO: NEGATIVE
LEUKOCYTE ESTERASE UR QL STRIP: NEGATIVE
NITRITE UR QL STRIP: NEGATIVE
PH UR STRIP.AUTO: 5.5 [PH] (ref 5–8)
POTASSIUM SERPL-SCNC: 5.1 MMOL/L (ref 3.6–5)
PROT ?TM UR-MCNC: (no result) MG/DL
RBC UR QL AUTO: NEGATIVE
SODIUM SERPL-SCNC: 139 MMOL/L (ref 137–145)
SP GR UR STRIP.AUTO: 1.01 (ref 1–1.03)
UROBILINOGEN UR-MCNC: NORMAL MG/DL
WBC #/AREA URNS HPF: (no result) /HPF (ref 0–3)

## 2018-05-08 ENCOUNTER — HOSPITAL ENCOUNTER (OUTPATIENT)
Dept: HOSPITAL 6 - LAB | Age: 78
End: 2018-05-08
Attending: FAMILY MEDICINE
Payer: MEDICARE

## 2018-05-08 DIAGNOSIS — E11.9: ICD-10-CM

## 2018-05-08 DIAGNOSIS — I10: ICD-10-CM

## 2018-05-08 DIAGNOSIS — J30.9: ICD-10-CM

## 2018-05-08 DIAGNOSIS — J44.1: Primary | ICD-10-CM

## 2018-05-08 LAB
ALBUMIN SERPL-MCNC: 3.7 G/DL (ref 3.5–5)
ALT SERPL-CCNC: 32 U/L (ref 21–72)
AST SERPL-CCNC: 20 U/L (ref 17–59)
BILIRUB SERPL-MCNC: 0.4 MG/DL (ref 0.2–1.3)
BUN/CREAT SERPL: 24.4 (ref 6–26)
CALCIUM SERPL-MCNC: 9.1 MG/DL (ref 8.4–10.2)
CO2 SERPL-SCNC: 30 MMOL/L (ref 22–30)
GLUCOSE SERPL-MCNC: 119 MG/DL (ref 75–110)
POTASSIUM SERPL-SCNC: 4.4 MMOL/L (ref 3.6–5)
PROT SERPL-MCNC: 7.3 G/DL (ref 6.3–8.2)
SODIUM SERPL-SCNC: 142 MMOL/L (ref 137–145)

## 2018-05-23 ENCOUNTER — HOSPITAL ENCOUNTER (OUTPATIENT)
Dept: HOSPITAL 6 - LAB | Age: 78
End: 2018-05-23
Attending: FAMILY MEDICINE
Payer: MEDICARE

## 2018-05-23 DIAGNOSIS — R33.9: ICD-10-CM

## 2018-05-23 DIAGNOSIS — R30.0: ICD-10-CM

## 2018-05-23 DIAGNOSIS — N31.9: ICD-10-CM

## 2018-05-23 DIAGNOSIS — N18.3: Primary | ICD-10-CM

## 2018-05-23 DIAGNOSIS — N39.0: ICD-10-CM

## 2018-05-23 LAB
APPEARANCE UR: (no result)
BILIRUB UR QL STRIP.AUTO: NEGATIVE
BUN/CREAT SERPL: 20.5 (ref 6–26)
CALCIUM SERPL-MCNC: 9.4 MG/DL (ref 8.4–10.2)
CO2 SERPL-SCNC: 34 MMOL/L (ref 22–30)
COLOR UR AUTO: YELLOW
GLUCOSE SERPL-MCNC: 117 MG/DL (ref 75–110)
GLUCOSE UR QL STRIP.AUTO: NEGATIVE MG/DL
KETONES UR QL STRIP.AUTO: NEGATIVE
LEUKOCYTE ESTERASE UR QL STRIP: (no result)
NITRITE UR QL STRIP: NEGATIVE
PH UR STRIP.AUTO: 6 [PH] (ref 5–8)
POTASSIUM SERPL-SCNC: 5 MMOL/L (ref 3.6–5)
PROT ?TM UR-MCNC: (no result) MG/DL
RBC UR QL AUTO: (no result)
SODIUM SERPL-SCNC: 141 MMOL/L (ref 137–145)
SP GR UR STRIP.AUTO: 1.01 (ref 1–1.03)
UROBILINOGEN UR-MCNC: NORMAL MG/DL
WBC #/AREA URNS HPF: >50 /HPF (ref 0–3)

## 2018-05-30 ENCOUNTER — HOSPITAL ENCOUNTER (OUTPATIENT)
Dept: HOSPITAL 6 - LAB | Age: 78
End: 2018-05-30
Attending: FAMILY MEDICINE
Payer: MEDICARE

## 2018-05-30 DIAGNOSIS — N31.9: Primary | ICD-10-CM

## 2018-05-30 DIAGNOSIS — R33.9: ICD-10-CM

## 2018-06-11 ENCOUNTER — HOSPITAL ENCOUNTER (OUTPATIENT)
Dept: HOSPITAL 6 - LAB | Age: 78
End: 2018-06-11
Attending: FAMILY MEDICINE
Payer: MEDICARE

## 2018-06-11 DIAGNOSIS — R33.9: Primary | ICD-10-CM

## 2018-06-11 DIAGNOSIS — N31.9: ICD-10-CM

## 2018-06-11 LAB
APPEARANCE UR: (no result)
BILIRUB UR QL STRIP.AUTO: NEGATIVE
COLOR UR AUTO: YELLOW
GLUCOSE UR QL STRIP.AUTO: NEGATIVE MG/DL
KETONES UR QL STRIP.AUTO: NEGATIVE
LEUKOCYTE ESTERASE UR QL STRIP: (no result)
NITRITE UR QL STRIP: POSITIVE
PH UR STRIP.AUTO: 5 [PH] (ref 5–8)
PROT ?TM UR-MCNC: (no result) MG/DL
RBC UR QL AUTO: NEGATIVE
SP GR UR STRIP.AUTO: 1 (ref 1–1.03)
UROBILINOGEN UR-MCNC: NORMAL MG/DL
WBC #/AREA URNS HPF: (no result) /HPF (ref 0–3)

## 2018-06-21 ENCOUNTER — HOSPITAL ENCOUNTER (OUTPATIENT)
Dept: HOSPITAL 6 - LAB | Age: 78
End: 2018-06-21
Attending: FAMILY MEDICINE
Payer: MEDICARE

## 2018-06-21 DIAGNOSIS — R33.9: Primary | ICD-10-CM

## 2018-06-21 DIAGNOSIS — N31.9: ICD-10-CM

## 2018-06-21 LAB
APPEARANCE UR: (no result)
BILIRUB UR QL STRIP.AUTO: NEGATIVE
COLOR UR AUTO: YELLOW
GLUCOSE UR QL STRIP.AUTO: NEGATIVE MG/DL
KETONES UR QL STRIP.AUTO: NEGATIVE
LEUKOCYTE ESTERASE UR QL STRIP: (no result)
NITRITE UR QL STRIP: POSITIVE
PH UR STRIP.AUTO: 5 [PH] (ref 5–8)
PROT ?TM UR-MCNC: (no result) MG/DL
RBC UR QL AUTO: NEGATIVE
SP GR UR STRIP.AUTO: 1.01 (ref 1–1.03)
UROBILINOGEN UR-MCNC: NORMAL MG/DL
WBC #/AREA URNS HPF: (no result) /HPF (ref 0–3)

## 2018-08-06 ENCOUNTER — HOSPITAL ENCOUNTER (OUTPATIENT)
Dept: HOSPITAL 6 - LAB | Age: 78
End: 2018-08-06
Attending: FAMILY MEDICINE
Payer: MEDICARE

## 2018-08-06 DIAGNOSIS — N18.3: ICD-10-CM

## 2018-08-06 DIAGNOSIS — E11.22: Primary | ICD-10-CM

## 2018-08-06 LAB
BUN/CREAT SERPL: 21.9 (ref 6–26)
CALCIUM SERPL-MCNC: 8.8 MG/DL (ref 8.4–10.2)
CO2 SERPL-SCNC: 28 MMOL/L (ref 22–30)
GLUCOSE SERPL-MCNC: 150 MG/DL (ref 75–110)
POTASSIUM SERPL-SCNC: 5 MMOL/L (ref 3.6–5)
SODIUM SERPL-SCNC: 134 MMOL/L (ref 137–145)

## 2018-08-10 ENCOUNTER — HOSPITAL ENCOUNTER (OUTPATIENT)
Dept: HOSPITAL 6 - LAB | Age: 78
End: 2018-08-10
Attending: FAMILY MEDICINE
Payer: MEDICARE

## 2018-08-10 ENCOUNTER — HOSPITAL ENCOUNTER (OUTPATIENT)
Dept: HOSPITAL 6 - AMSURD | Age: 78
LOS: 2 days | Discharge: HOME | End: 2018-08-12
Attending: FAMILY MEDICINE
Payer: MEDICARE

## 2018-08-10 VITALS — BODY MASS INDEX: 41 KG/M2 | HEIGHT: 67.99 IN | WEIGHT: 270.51 LBS

## 2018-08-10 VITALS — SYSTOLIC BLOOD PRESSURE: 121 MMHG | DIASTOLIC BLOOD PRESSURE: 57 MMHG

## 2018-08-10 DIAGNOSIS — R33.9: ICD-10-CM

## 2018-08-10 DIAGNOSIS — N39.0: Primary | ICD-10-CM

## 2018-08-10 DIAGNOSIS — N39.0: ICD-10-CM

## 2018-08-10 DIAGNOSIS — N18.3: Primary | ICD-10-CM

## 2018-08-10 LAB
APPEARANCE UR: (no result)
BILIRUB UR QL STRIP.AUTO: NEGATIVE
COLOR UR AUTO: YELLOW
GLUCOSE UR QL STRIP.AUTO: NEGATIVE MG/DL
KETONES UR QL STRIP.AUTO: NEGATIVE
LEUKOCYTE ESTERASE UR QL STRIP: (no result)
NITRITE UR QL STRIP: POSITIVE
PH UR STRIP.AUTO: 5.5 [PH] (ref 5–8)
PROT ?TM UR-MCNC: (no result) MG/DL
RBC UR QL AUTO: NEGATIVE
SP GR UR STRIP.AUTO: 1 (ref 1–1.03)
UROBILINOGEN UR-MCNC: NORMAL MG/DL
WBC #/AREA URNS HPF: >50 /HPF (ref 0–3)

## 2018-08-11 VITALS — SYSTOLIC BLOOD PRESSURE: 127 MMHG | DIASTOLIC BLOOD PRESSURE: 63 MMHG

## 2018-08-12 VITALS
SYSTOLIC BLOOD PRESSURE: 115 MMHG | DIASTOLIC BLOOD PRESSURE: 71 MMHG | SYSTOLIC BLOOD PRESSURE: 115 MMHG | DIASTOLIC BLOOD PRESSURE: 71 MMHG

## 2018-09-21 ENCOUNTER — HOSPITAL ENCOUNTER (OUTPATIENT)
Dept: HOSPITAL 6 - LAB | Age: 78
End: 2018-09-21
Attending: FAMILY MEDICINE
Payer: MEDICARE

## 2018-09-21 VITALS — DIASTOLIC BLOOD PRESSURE: 67 MMHG | SYSTOLIC BLOOD PRESSURE: 143 MMHG

## 2018-09-21 DIAGNOSIS — N39.0: ICD-10-CM

## 2018-09-21 DIAGNOSIS — R33.9: ICD-10-CM

## 2018-09-21 DIAGNOSIS — N18.3: Primary | ICD-10-CM

## 2018-09-22 VITALS — SYSTOLIC BLOOD PRESSURE: 142 MMHG | DIASTOLIC BLOOD PRESSURE: 74 MMHG

## 2018-09-23 ENCOUNTER — HOSPITAL ENCOUNTER (OUTPATIENT)
Dept: HOSPITAL 6 - AMSURD | Age: 78
Discharge: HOME | End: 2018-09-23
Attending: FAMILY MEDICINE
Payer: MEDICARE

## 2018-09-23 VITALS — SYSTOLIC BLOOD PRESSURE: 149 MMHG | DIASTOLIC BLOOD PRESSURE: 66 MMHG

## 2018-09-23 VITALS — BODY MASS INDEX: 41 KG/M2 | WEIGHT: 270.51 LBS | HEIGHT: 67.99 IN

## 2018-09-23 DIAGNOSIS — N39.0: Primary | ICD-10-CM

## 2018-11-21 VITALS — DIASTOLIC BLOOD PRESSURE: 68 MMHG | SYSTOLIC BLOOD PRESSURE: 131 MMHG

## 2018-11-22 ENCOUNTER — HOSPITAL ENCOUNTER (OUTPATIENT)
Dept: HOSPITAL 6 - AMSURD | Age: 78
Discharge: HOME | End: 2018-11-22
Attending: FAMILY MEDICINE
Payer: MEDICARE

## 2018-11-22 VITALS
DIASTOLIC BLOOD PRESSURE: 72 MMHG | DIASTOLIC BLOOD PRESSURE: 72 MMHG | DIASTOLIC BLOOD PRESSURE: 72 MMHG | SYSTOLIC BLOOD PRESSURE: 136 MMHG | SYSTOLIC BLOOD PRESSURE: 136 MMHG | DIASTOLIC BLOOD PRESSURE: 72 MMHG | DIASTOLIC BLOOD PRESSURE: 72 MMHG | SYSTOLIC BLOOD PRESSURE: 136 MMHG | SYSTOLIC BLOOD PRESSURE: 136 MMHG | SYSTOLIC BLOOD PRESSURE: 136 MMHG | SYSTOLIC BLOOD PRESSURE: 136 MMHG | SYSTOLIC BLOOD PRESSURE: 136 MMHG | SYSTOLIC BLOOD PRESSURE: 136 MMHG | DIASTOLIC BLOOD PRESSURE: 72 MMHG | DIASTOLIC BLOOD PRESSURE: 72 MMHG | DIASTOLIC BLOOD PRESSURE: 72 MMHG | DIASTOLIC BLOOD PRESSURE: 72 MMHG | DIASTOLIC BLOOD PRESSURE: 72 MMHG | SYSTOLIC BLOOD PRESSURE: 136 MMHG | SYSTOLIC BLOOD PRESSURE: 136 MMHG

## 2018-11-22 VITALS — WEIGHT: 270.51 LBS | HEIGHT: 67.99 IN | BODY MASS INDEX: 41 KG/M2

## 2018-11-22 DIAGNOSIS — N39.0: Primary | ICD-10-CM

## 2019-01-21 ENCOUNTER — HOSPITAL ENCOUNTER (OUTPATIENT)
Dept: HOSPITAL 6 - LAB | Age: 79
End: 2019-01-21
Attending: FAMILY MEDICINE
Payer: MEDICARE

## 2019-01-21 DIAGNOSIS — B99.9: ICD-10-CM

## 2019-01-21 DIAGNOSIS — N31.9: ICD-10-CM

## 2019-01-21 DIAGNOSIS — N39.0: Primary | ICD-10-CM

## 2019-01-21 LAB
APPEARANCE UR: CLEAR
BILIRUB UR QL STRIP.AUTO: NEGATIVE
COLOR UR AUTO: YELLOW
GLUCOSE UR QL STRIP.AUTO: NEGATIVE MG/DL
KETONES UR QL STRIP.AUTO: NEGATIVE
LEUKOCYTE ESTERASE UR QL STRIP: (no result)
NITRITE UR QL STRIP: NEGATIVE
PH UR STRIP.AUTO: 6.5 [PH] (ref 5–8)
PROT ?TM UR-MCNC: (no result) MG/DL
RBC UR QL AUTO: NEGATIVE
SP GR UR STRIP.AUTO: 1.01 (ref 1–1.03)
UROBILINOGEN UR-MCNC: NORMAL MG/DL
WBC #/AREA URNS HPF: (no result) /HPF (ref 0–3)

## 2019-02-01 ENCOUNTER — HOSPITAL ENCOUNTER (OUTPATIENT)
Dept: HOSPITAL 6 - LAB | Age: 79
End: 2019-02-01
Attending: FAMILY MEDICINE
Payer: MEDICARE

## 2019-02-01 DIAGNOSIS — B99.9: ICD-10-CM

## 2019-02-01 DIAGNOSIS — N31.9: Primary | ICD-10-CM

## 2019-02-01 DIAGNOSIS — N39.0: ICD-10-CM

## 2019-02-01 LAB
APPEARANCE UR: (no result)
BILIRUB UR QL STRIP.AUTO: NEGATIVE
COLOR UR AUTO: YELLOW
GLUCOSE UR QL STRIP.AUTO: NEGATIVE MG/DL
KETONES UR QL STRIP.AUTO: NEGATIVE
LEUKOCYTE ESTERASE UR QL STRIP: (no result)
NITRITE UR QL STRIP: NEGATIVE
PH UR STRIP.AUTO: 5.5 [PH] (ref 5–8)
PROT ?TM UR-MCNC: (no result) MG/DL
RBC UR QL AUTO: (no result)
SP GR UR STRIP.AUTO: 1.01 (ref 1–1.03)
UROBILINOGEN UR-MCNC: NORMAL MG/DL
WBC #/AREA URNS HPF: >50 /HPF (ref 0–3)

## 2019-02-28 ENCOUNTER — HOSPITAL ENCOUNTER (OUTPATIENT)
Dept: HOSPITAL 6 - LAB | Age: 79
End: 2019-02-28
Attending: FAMILY MEDICINE
Payer: MEDICARE

## 2019-02-28 DIAGNOSIS — N30.00: Primary | ICD-10-CM

## 2019-02-28 DIAGNOSIS — B96.1: ICD-10-CM

## 2019-02-28 DIAGNOSIS — R33.9: ICD-10-CM

## 2019-02-28 DIAGNOSIS — N31.9: ICD-10-CM

## 2019-03-01 LAB
APPEARANCE UR: (no result)
BILIRUB UR QL STRIP.AUTO: NEGATIVE
COLOR UR AUTO: YELLOW
GLUCOSE UR QL STRIP.AUTO: NEGATIVE MG/DL
KETONES UR QL STRIP.AUTO: NEGATIVE
LEUKOCYTE ESTERASE UR QL STRIP: (no result)
NITRITE UR QL STRIP: POSITIVE
PH UR STRIP.AUTO: 5 [PH] (ref 5–8)
PROT ?TM UR-MCNC: (no result) MG/DL
RBC UR QL AUTO: (no result)
SP GR UR STRIP.AUTO: 1.01 (ref 1–1.03)
UROBILINOGEN UR-MCNC: NORMAL MG/DL
WBC #/AREA URNS HPF: (no result) /HPF (ref 0–3)

## 2019-06-04 ENCOUNTER — HOSPITAL ENCOUNTER (OUTPATIENT)
Dept: HOSPITAL 6 - LAB | Age: 79
End: 2019-06-04
Attending: FAMILY MEDICINE
Payer: MEDICARE

## 2019-06-04 DIAGNOSIS — N18.3: ICD-10-CM

## 2019-06-04 DIAGNOSIS — I12.9: Primary | ICD-10-CM

## 2019-06-04 DIAGNOSIS — E11.22: ICD-10-CM

## 2019-06-04 LAB
ALBUMIN SERPL-MCNC: 3.8 G/DL (ref 3.4–4.8)
ALT SERPL-CCNC: 13 U/L (ref 0–55)
AST SERPL-CCNC: 17 U/L (ref 5–34)
BILIRUB SERPL-MCNC: 0.3 MG/DL (ref 0.2–1.2)
CALCIUM SERPL-MCNC: 9.9 MG/DL (ref 8.3–10.5)
CO2 SERPL-SCNC: 25 MMOL/L (ref 23–31)
GLUCOSE SERPL-MCNC: 107 MG/DL (ref 75–110)
POTASSIUM SERPL-SCNC: 5.5 MMOL/L (ref 3.5–5.1)
PROT SERPL-MCNC: 7.3 G/DL (ref 6.2–8.1)
SODIUM SERPL-SCNC: 142 MMOL/L (ref 136–145)

## 2019-06-05 ENCOUNTER — HOSPITAL ENCOUNTER (OUTPATIENT)
Dept: HOSPITAL 6 - LAB | Age: 79
End: 2019-06-05
Attending: FAMILY MEDICINE
Payer: MEDICARE

## 2019-06-05 DIAGNOSIS — E11.22: Primary | ICD-10-CM

## 2019-06-05 DIAGNOSIS — E87.5: ICD-10-CM

## 2019-06-05 DIAGNOSIS — N31.9: ICD-10-CM

## 2019-06-05 DIAGNOSIS — R33.9: ICD-10-CM

## 2019-06-05 DIAGNOSIS — N18.3: ICD-10-CM

## 2019-06-05 LAB
CALCIUM SERPL-MCNC: 9.4 MG/DL (ref 8.3–10.5)
CO2 SERPL-SCNC: 25 MMOL/L (ref 23–31)
GLUCOSE SERPL-MCNC: 186 MG/DL (ref 75–110)
POTASSIUM SERPL-SCNC: 5.7 MMOL/L (ref 3.5–5.1)
SODIUM SERPL-SCNC: 142 MMOL/L (ref 136–145)

## 2019-06-06 ENCOUNTER — HOSPITAL ENCOUNTER (OUTPATIENT)
Dept: HOSPITAL 6 - PT | Age: 79
Discharge: STILL ADMITTED OR ADMINISTRATIVE | End: 2019-06-06
Attending: FAMILY MEDICINE
Payer: MEDICARE

## 2019-06-06 DIAGNOSIS — R20.0: ICD-10-CM

## 2019-06-06 DIAGNOSIS — M43.6: Primary | ICD-10-CM

## 2019-06-19 ENCOUNTER — HOSPITAL ENCOUNTER (OUTPATIENT)
Dept: HOSPITAL 6 - LAB | Age: 79
End: 2019-06-19
Attending: FAMILY MEDICINE
Payer: MEDICARE

## 2019-06-19 DIAGNOSIS — N18.3: ICD-10-CM

## 2019-06-19 DIAGNOSIS — E87.5: Primary | ICD-10-CM

## 2019-06-19 LAB
CALCIUM SERPL-MCNC: 9.9 MG/DL (ref 8.3–10.5)
CO2 SERPL-SCNC: 27 MMOL/L (ref 23–31)
GLUCOSE SERPL-MCNC: 104 MG/DL (ref 75–110)
POTASSIUM SERPL-SCNC: 5.2 MMOL/L (ref 3.5–5.1)
SODIUM SERPL-SCNC: 143 MMOL/L (ref 136–145)

## 2019-07-25 ENCOUNTER — HOSPITAL ENCOUNTER (OUTPATIENT)
Dept: HOSPITAL 6 - LAB | Age: 79
End: 2019-07-25
Attending: FAMILY MEDICINE
Payer: MEDICARE

## 2019-07-25 DIAGNOSIS — E78.5: ICD-10-CM

## 2019-07-25 DIAGNOSIS — Z12.5: Primary | ICD-10-CM

## 2019-07-25 DIAGNOSIS — N19: ICD-10-CM

## 2019-07-25 LAB
ALBUMIN SERPL-MCNC: 3.5 G/DL (ref 3.4–4.8)
ALT SERPL-CCNC: 15 U/L (ref 0–55)
AST SERPL-CCNC: 14 U/L (ref 5–34)
BILIRUB SERPL-MCNC: 0.4 MG/DL (ref 0.2–1.2)
CALCIUM SERPL-MCNC: 9.2 MG/DL (ref 8.3–10.5)
CO2 SERPL-SCNC: 25 MMOL/L (ref 23–31)
CREAT UR-MCNC: 46 MG/DL
GLUCOSE SERPL-MCNC: 110 MG/DL (ref 75–110)
POTASSIUM SERPL-SCNC: 5.2 MMOL/L (ref 3.5–5.1)
PROT SERPL-MCNC: 7.1 G/DL (ref 6.2–8.1)
SODIUM SERPL-SCNC: 142 MMOL/L (ref 136–145)

## 2019-08-14 ENCOUNTER — HOSPITAL ENCOUNTER (OUTPATIENT)
Dept: HOSPITAL 6 - LAB | Age: 79
End: 2019-08-14
Attending: FAMILY MEDICINE
Payer: MEDICARE

## 2019-08-14 DIAGNOSIS — N18.3: ICD-10-CM

## 2019-08-14 DIAGNOSIS — E11.40: ICD-10-CM

## 2019-08-14 DIAGNOSIS — J44.9: ICD-10-CM

## 2019-08-14 DIAGNOSIS — E11.22: ICD-10-CM

## 2019-08-14 DIAGNOSIS — I27.20: ICD-10-CM

## 2019-08-14 DIAGNOSIS — N31.9: ICD-10-CM

## 2019-08-14 DIAGNOSIS — Z00.00: Primary | ICD-10-CM

## 2019-08-14 DIAGNOSIS — I35.0: ICD-10-CM

## 2019-08-14 LAB
ERYTHROCYTE [DISTWIDTH] IN BLOOD BY AUTOMATED COUNT: 14.8 % (ref 11.5–14.5)
HCT VFR BLD AUTO: 41.4 % (ref 42–52)
HGB BLD-MCNC: 12.6 G/DL (ref 13.5–18)
LYMPHOCYTES NFR BLD MANUAL: 32 % (ref 20–51)
MCH RBC QN AUTO: 29 PG (ref 27–31)
MCHC RBC AUTO-ENTMCNC: 30 G/DL (ref 33–37)
MCV RBC AUTO: 95 FL (ref 78–100)
MONOCYTES NFR BLD: 6 % (ref 3–10)
NEUTS SEG NFR BLD MANUAL: 55 % (ref 42–75)
PLATELET # BLD AUTO: 199 K/MM3 (ref 130–400)
PMV BLD AUTO: 10.4 FL (ref 7.4–10.4)
RBC # BLD AUTO: 4.38 M/MM3 (ref 4.2–5.6)
WBC # BLD AUTO: 5.3 K/MM3 (ref 4.8–10.8)

## 2019-11-24 ENCOUNTER — HOSPITAL ENCOUNTER (EMERGENCY)
Dept: HOSPITAL 6 - ED | Age: 79
LOS: 1 days | Discharge: OTHER INSTITUTION | End: 2019-11-25
Payer: MEDICARE

## 2019-11-24 DIAGNOSIS — E66.01: ICD-10-CM

## 2019-11-24 DIAGNOSIS — Z79.82: ICD-10-CM

## 2019-11-24 DIAGNOSIS — Z79.51: ICD-10-CM

## 2019-11-24 DIAGNOSIS — Z98.890: ICD-10-CM

## 2019-11-24 DIAGNOSIS — J44.9: ICD-10-CM

## 2019-11-24 DIAGNOSIS — E11.9: ICD-10-CM

## 2019-11-24 DIAGNOSIS — I50.9: ICD-10-CM

## 2019-11-24 DIAGNOSIS — G47.30: ICD-10-CM

## 2019-11-24 DIAGNOSIS — J96.20: Primary | ICD-10-CM

## 2019-11-24 DIAGNOSIS — I27.20: ICD-10-CM

## 2019-11-24 DIAGNOSIS — Z79.84: ICD-10-CM

## 2019-11-24 PROCEDURE — A4618 BREATHING CIRCUITS: HCPCS

## 2019-11-24 PROCEDURE — A4340 INDWELLING CATHETER SPECIAL: HCPCS

## 2019-11-25 ENCOUNTER — HOSPITAL ENCOUNTER (INPATIENT)
Dept: HOSPITAL 6 - MED/SURG | Age: 79
LOS: 3 days | Discharge: NURSING, REHAB OR LONG-TERM CARE | DRG: 189 | End: 2019-11-28
Attending: FAMILY MEDICINE | Admitting: GENERAL PRACTICE
Payer: MEDICARE

## 2019-11-25 VITALS — SYSTOLIC BLOOD PRESSURE: 130 MMHG | DIASTOLIC BLOOD PRESSURE: 72 MMHG

## 2019-11-25 VITALS — DIASTOLIC BLOOD PRESSURE: 73 MMHG | SYSTOLIC BLOOD PRESSURE: 145 MMHG

## 2019-11-25 VITALS — BODY MASS INDEX: 42.5 KG/M2 | HEIGHT: 67.99 IN | WEIGHT: 280.43 LBS

## 2019-11-25 VITALS — SYSTOLIC BLOOD PRESSURE: 97 MMHG | DIASTOLIC BLOOD PRESSURE: 46 MMHG

## 2019-11-25 VITALS — DIASTOLIC BLOOD PRESSURE: 71 MMHG | SYSTOLIC BLOOD PRESSURE: 154 MMHG

## 2019-11-25 VITALS — SYSTOLIC BLOOD PRESSURE: 134 MMHG | DIASTOLIC BLOOD PRESSURE: 61 MMHG

## 2019-11-25 VITALS — DIASTOLIC BLOOD PRESSURE: 57 MMHG | SYSTOLIC BLOOD PRESSURE: 94 MMHG

## 2019-11-25 VITALS — SYSTOLIC BLOOD PRESSURE: 123 MMHG | DIASTOLIC BLOOD PRESSURE: 61 MMHG

## 2019-11-25 VITALS — DIASTOLIC BLOOD PRESSURE: 72 MMHG | SYSTOLIC BLOOD PRESSURE: 130 MMHG

## 2019-11-25 DIAGNOSIS — I27.20: ICD-10-CM

## 2019-11-25 DIAGNOSIS — E66.2: ICD-10-CM

## 2019-11-25 DIAGNOSIS — I27.81: ICD-10-CM

## 2019-11-25 DIAGNOSIS — J44.9: ICD-10-CM

## 2019-11-25 DIAGNOSIS — E11.9: ICD-10-CM

## 2019-11-25 DIAGNOSIS — J96.21: ICD-10-CM

## 2019-11-25 DIAGNOSIS — R33.9: ICD-10-CM

## 2019-11-25 DIAGNOSIS — I50.9: ICD-10-CM

## 2019-11-25 DIAGNOSIS — R00.1: ICD-10-CM

## 2019-11-25 DIAGNOSIS — E87.2: ICD-10-CM

## 2019-11-25 DIAGNOSIS — J96.22: Primary | ICD-10-CM

## 2019-11-25 DIAGNOSIS — Z99.81: ICD-10-CM

## 2019-11-25 DIAGNOSIS — Z79.82: ICD-10-CM

## 2019-11-25 DIAGNOSIS — Z79.84: ICD-10-CM

## 2019-11-25 DIAGNOSIS — I95.9: ICD-10-CM

## 2019-11-25 LAB
ALBUMIN SERPL-MCNC: 3.2 G/DL (ref 3.4–4.8)
ALBUMIN SERPL-MCNC: 3.5 G/DL (ref 3.4–4.8)
ALT SERPL-CCNC: 10 U/L (ref 0–55)
ALT SERPL-CCNC: 11 U/L (ref 0–55)
APPEARANCE UR: (no result)
AST SERPL-CCNC: 8 U/L (ref 5–34)
AST SERPL-CCNC: 8 U/L (ref 5–34)
BASOPHILS # BLD: 0 10*3/UL (ref 0.02–0.1)
BILIRUB SERPL-MCNC: 0.4 MG/DL (ref 0.2–1.2)
BILIRUB SERPL-MCNC: 0.5 MG/DL (ref 0.2–1.2)
BILIRUB UR QL STRIP.AUTO: NEGATIVE
CALCIUM SERPL-MCNC: 8.8 MG/DL (ref 8.3–10.5)
CALCIUM SERPL-MCNC: 8.9 MG/DL (ref 8.3–10.5)
CALCIUM SERPL-MCNC: 9.4 MG/DL (ref 8.3–10.5)
CO2 SERPL-SCNC: 29 MMOL/L (ref 23–31)
CO2 SERPL-SCNC: 30 MMOL/L (ref 23–31)
CO2 SERPL-SCNC: 30 MMOL/L (ref 23–31)
COLOR UR AUTO: YELLOW
D DIMER PPP FEU-MCNC: 1.99 MG/L FEU (ref 0.15–0.5)
EOSINOPHIL # BLD: 0.4 10*3/UL (ref 0.04–0.4)
EOSINOPHIL NFR BLD: 5.9 % (ref 0–4)
ERYTHROCYTE [DISTWIDTH] IN BLOOD BY AUTOMATED COUNT: 14.3 % (ref 11.5–14.5)
ERYTHROCYTE [DISTWIDTH] IN BLOOD BY AUTOMATED COUNT: 14.3 % (ref 11.5–14.5)
ERYTHROCYTE [DISTWIDTH] IN BLOOD BY AUTOMATED COUNT: 14.5 % (ref 11.5–14.5)
GLUCOSE SERPL-MCNC: 114 MG/DL (ref 75–110)
GLUCOSE SERPL-MCNC: 118 MG/DL (ref 75–110)
GLUCOSE SERPL-MCNC: 124 MG/DL (ref 75–110)
GLUCOSE UR QL STRIP.AUTO: NEGATIVE MG/DL
HCT VFR BLD AUTO: 36.3 % (ref 42–52)
HCT VFR BLD AUTO: 36.8 % (ref 42–52)
HCT VFR BLD AUTO: 38.9 % (ref 42–52)
HGB BLD-MCNC: 10.7 G/DL (ref 13.5–18)
HGB BLD-MCNC: 10.8 G/DL (ref 13.5–18)
HGB BLD-MCNC: 11.6 G/DL (ref 13.5–18)
KETONES UR QL STRIP.AUTO: NEGATIVE
LEUKOCYTE ESTERASE UR QL STRIP: NEGATIVE
LYMPHOCYTES # BLD: 1.7 10*3/UL (ref 1.5–4)
LYMPHOCYTES NFR BLD MANUAL: 20 % (ref 20–51)
LYMPHOCYTES NFR BLD MANUAL: 21 % (ref 20–51)
MCH RBC QN AUTO: 29 PG (ref 27–31)
MCHC RBC AUTO-ENTMCNC: 29 G/DL (ref 33–37)
MCHC RBC AUTO-ENTMCNC: 30 G/DL (ref 33–37)
MCHC RBC AUTO-ENTMCNC: 30 G/DL (ref 33–37)
MCV RBC AUTO: 98 FL (ref 78–100)
MONOCYTES # BLD: 0.8 10*3/UL (ref 0.2–0.8)
MONOCYTES NFR BLD: 10 % (ref 3–10)
MONOCYTES NFR BLD: 8 % (ref 3–10)
NEUTROPHILS # BLD: 3.9 10*3/UL (ref 1.4–6.5)
NEUTS SEG NFR BLD MANUAL: 66 % (ref 42–75)
NEUTS SEG NFR BLD MANUAL: 69 % (ref 42–75)
NITRITE UR QL STRIP: NEGATIVE
PH UR STRIP.AUTO: 5 [PH] (ref 5–8)
PLATELET # BLD AUTO: 151 K/MM3 (ref 130–400)
PLATELET # BLD AUTO: 168 K/MM3 (ref 130–400)
PLATELET # BLD AUTO: 180 K/MM3 (ref 130–400)
PMV BLD AUTO: 10.3 FL (ref 7.4–10.4)
PMV BLD AUTO: 9.8 FL (ref 7.4–10.4)
PMV BLD AUTO: 9.9 FL (ref 7.4–10.4)
POTASSIUM SERPL-SCNC: 5 MMOL/L (ref 3.5–5.1)
POTASSIUM SERPL-SCNC: 5 MMOL/L (ref 3.5–5.1)
POTASSIUM SERPL-SCNC: 5.1 MMOL/L (ref 3.5–5.1)
PROT ?TM UR-MCNC: (no result) MG/DL
PROT SERPL-MCNC: 6.7 G/DL (ref 6.2–8.1)
PROT SERPL-MCNC: 7.2 G/DL (ref 6.2–8.1)
RBC # BLD AUTO: 3.69 M/MM3 (ref 4.2–5.6)
RBC # BLD AUTO: 3.75 M/MM3 (ref 4.2–5.6)
RBC # BLD AUTO: 3.98 M/MM3 (ref 4.2–5.6)
RBC UR QL AUTO: NEGATIVE
SODIUM SERPL-SCNC: 140 MMOL/L (ref 136–145)
SODIUM SERPL-SCNC: 140 MMOL/L (ref 136–145)
SODIUM SERPL-SCNC: 141 MMOL/L (ref 136–145)
SP GR UR STRIP.AUTO: 1.02 (ref 1–1.03)
TROPONIN I SERPL-MCNC: 0.03 NG/ML (ref ?–0.03)
UROBILINOGEN UR-MCNC: NORMAL MG/DL
WBC # BLD AUTO: 6.8 K/MM3 (ref 4.8–10.8)
WBC # BLD AUTO: 7.8 K/MM3 (ref 4.8–10.8)
WBC # BLD AUTO: 9 K/MM3 (ref 4.8–10.8)
WBC #/AREA URNS HPF: (no result) /HPF (ref 0–3)

## 2019-11-25 PROCEDURE — A7027 COMBINATION ORAL/NASAL MASK: HCPCS

## 2019-11-25 PROCEDURE — A4216 STERILE WATER/SALINE, 10 ML: HCPCS

## 2019-11-25 NOTE — NUR
patient more alert. wakes up enough to transfer from chair to bed. sp02 92%.
heart rate in the upper to lower 50's.

## 2019-11-25 NOTE — NUR
report received from dorita lopes rn this nurse and lemuel butcher assuming patient
care. patient on bipap machine. fi02 50%. epap 18. ipap 8. patient awake and
alert.

## 2019-11-25 NOTE — NUR
patient's heart rate 39-40. sp02 88-90% on 32% fi02. upon walking in room.
patient slouched over in recliner. eyes closed. patient arousable to sternal
rub. opens eyes and immediately closes them again. mask readjusted. jeff fernandez notified. no new orders.

## 2019-11-25 NOTE — NUR
SPOKE TO LOW MARTIN IN PERSON AND NOTIFIED HER THAT PATIENT'S BLOOD
PRESSURE /58 AND HE IS TO RECEIVE METOPROLOL, SOTALOL, AMLODIPINE, AND
IV LASIX. ORDERS RECEIVED TO RESCHEDULE THE METOPROLOL AND AMLODIPINE AND
RECHECK BLOOD PRESSURE IN 4 HOURS. ORDER RECEIVED THAT IT IS OKAY TO GIVE
LASIX AND SOTALOL AT THIS TIME. PT'S HR IS 72. LOW MARTIN IS ALSO
NOTIFIED OF PT'S TEMP .6 ORALLY AT THIS TIME WITH NO MEDICATION ORDERED
TO GIVE TO DECREASE TEMP.

## 2019-11-25 NOTE — NUR
Met with pt to discuss discharge plans. [place]. Next of Kin is his wife, Kristal, phone # [phone]. States that she is
in fairly good health but just had TKA. States that they live is a multi-level
home but that the main bedroom and bathroom are on the main level with only
one step to enter the home. DME: cane, 4WW with breaks, electric scooter, arm
crutch. States that he primarily uses his cane and crutch when ambulating in
the home. Also reports that he is on O2 chronically, baseline of 3L via NC,
and that he uses a CPAP at bedtime. Pt in unsure of the name of the company
that supplies his O2. He thinks he may need a BiPAP at home at discharge. Pt
is followed by Dr. Gupta (Pulmonology) and wants to make sure he is aware of
any changes at discharge. Pt plans to return home with his wife upon
discharge.  He gets his prescriptions at [facility] and denies any financial
concerns at this time. Pt states that he has a DPOA for health care and that
he will ask his wife to bring a copy up to the hospital later today. Pt denies
any further questions or concerns at this time. Case Management will continue
to follow for discharge planning.

## 2019-11-25 NOTE — NUR
AT 1300 TODAY PATIENT NOTED TO BE LETHARGIC, DIFFICULT TO AROUSE. LOW MARTIN NOTIFIED OF CHANGE. NO NEW ORDERS RECEIVED. RESPIRATORY
STATUS MONITORED. PATIENT REMAINS ON TRILOGY WITH AVAPS SETTING IN PLACE. WILL
CONTINUE TO MONITOR.

## 2019-11-25 NOTE — NUR
patient's sp02 85%. patient's trilogy machine noted to have fio2 setting on
28%. heart rate 39. fi02 increased to 36. tidal volume lowered to 560.
patient remains difficult to arouse. requiring sternal rub to open eyes.
quickly falls back to sleep. obtundent. lips have bluish coloring. jeff fernandez notified of patient's continued difficutly arousing and low heart
rate/sp02.  notified that patient has only had 300 mls of urine output this
shift and that abdomen appears to be even more distended. patient has palpable
firm area to left side of abdomen. jeff fernandez orders labs.  states
that heart rate is from the sotalol. requested that provider come see patient.
reported that she saw patient this morning.

## 2019-11-25 NOTE — NUR
patient's sp02 85%. patient's trilogy machine noted to have fio2 setting on .
heart rate 39. fi02 increased to 36. tidal volume lowered to 560. patient
remains difficult to arouse. requiring sternal rub to open eyes. quickly falls
back to sleep. obtundent. lips have bluish coloring. jeff fernandez
notified of patient's continued difficutly arousing and low heart rate/sp02.
notified that patient has only had 300 mls of urine output this shift and that
abdomen appears to be even more distended. patient has palpable firm area to
left side of abdomen. jeff fernandez orders labs.  states that heart rate
is from the sotalol. requested that provider come see patient. reported that
she saw patient this morning.

## 2019-11-25 NOTE — NUR
patient placed on trilogy machine per jeff fernandez's orders. trilogy
settings as follows: avaps ae mode, tidal volume target of 560, fi02 36%. epap
max 15, epap min 5. apnea alarm at 30 secs. patient tolerating trilogy okay.
sp02 90-91%. heart rate in the 50's.

## 2019-11-25 NOTE — NUR
patient placed on nasal cannula for breakfast. took approximately 5min to
recover transition from bipap to cannula. sp02 83% in beginning. oxygen up to
5l for patient to recover. once recovered lowered oxygen to 4L. sp02 90%.

## 2019-11-25 NOTE — NUR
patient's heart rate down to 41-43. sp02 91%. this nurse in patient's room.
patient noted to be on nasal cannula at 6L oxygen. patient sitting up in
recliner head down eyes closed. respirations very shallow. patient does not
arouse to verbal stimuli or when this nurse puts hand on shoulder to wake up.
patient opens eyes to sternal rub.eyes half open and has difficulty keeping
eyes open. obtundent. this nurse asked patient if he was feeling okay patient
states "yeah" and falls back to sleep. increased tidal volume to 620 per
trilogy recommended settings chart stating patient's tidal volume target to be
between 8-10 ml/kg of ideal body weight. patient placed on fi02 36%. patient's
oxygen 90-91%. heart rate 44-45. jeff fernanedz notified.

## 2019-11-25 NOTE — NUR
patient's heart rate down to 41-43. sp02 91%. this nurse in patient's room.
patient noted to be on nasal cannula at 6L oxygen. patient sitting up in
recliner head down eyes closed. respirations very shallow. patient does not
arouse to verbal stimuli or when this nurse puts hand on shoulder to wake up.
patient opens eyes to sternal rub.eyes half open and has difficulty keeping
eyes open. this nurse asked patient if he was feeling okay patient states
"yeah" and falls back to sleep. increased tidal volume to 620 per trilogy
recommended settings chart stating patient's tidal volume target to be between
8-10 ml/kg of ideal body weight. patient placed on fi02 36%. patient's oxygen
90-91%. heart rate 44-45. jeff fernandez notified.

## 2019-11-25 NOTE — NUR
patient's sp02 91-92%. heart rate up to 55-58. remains on fi02 of 32%. patient
tolerating well. patient awake and conversating in bed.

## 2019-11-25 NOTE — NUR
patient's sp02 95% on fi02 of 36%. heart rate 44. lowered patient's fio2 to
32%. patient in chair with eyes open at this time.

## 2019-11-26 VITALS — DIASTOLIC BLOOD PRESSURE: 78 MMHG | SYSTOLIC BLOOD PRESSURE: 168 MMHG

## 2019-11-26 VITALS — DIASTOLIC BLOOD PRESSURE: 70 MMHG | SYSTOLIC BLOOD PRESSURE: 135 MMHG

## 2019-11-26 VITALS — SYSTOLIC BLOOD PRESSURE: 131 MMHG | DIASTOLIC BLOOD PRESSURE: 63 MMHG

## 2019-11-26 VITALS — SYSTOLIC BLOOD PRESSURE: 141 MMHG | DIASTOLIC BLOOD PRESSURE: 63 MMHG

## 2019-11-26 VITALS — SYSTOLIC BLOOD PRESSURE: 158 MMHG | DIASTOLIC BLOOD PRESSURE: 82 MMHG

## 2019-11-26 VITALS — DIASTOLIC BLOOD PRESSURE: 64 MMHG | SYSTOLIC BLOOD PRESSURE: 136 MMHG

## 2019-11-26 VITALS — DIASTOLIC BLOOD PRESSURE: 73 MMHG | SYSTOLIC BLOOD PRESSURE: 144 MMHG

## 2019-11-26 LAB
ALBUMIN SERPL-MCNC: 3.2 G/DL (ref 3.4–4.8)
ALT SERPL-CCNC: 10 U/L (ref 0–55)
AST SERPL-CCNC: 9 U/L (ref 5–34)
BASOPHILS # BLD: 0 10*3/UL (ref 0.02–0.1)
BILIRUB SERPL-MCNC: 0.5 MG/DL (ref 0.2–1.2)
CALCIUM SERPL-MCNC: 9.1 MG/DL (ref 8.3–10.5)
CO2 SERPL-SCNC: 30 MMOL/L (ref 23–31)
EOSINOPHIL # BLD: 0.4 10*3/UL (ref 0.04–0.4)
EOSINOPHIL NFR BLD: 5.3 % (ref 0–4)
ERYTHROCYTE [DISTWIDTH] IN BLOOD BY AUTOMATED COUNT: 14.3 % (ref 11.5–14.5)
GLUCOSE SERPL-MCNC: 94 MG/DL (ref 75–110)
HCT VFR BLD AUTO: 37 % (ref 42–52)
HGB BLD-MCNC: 11.1 G/DL (ref 13.5–18)
LYMPHOCYTES # BLD: 1.4 10*3/UL (ref 1.5–4)
MCH RBC QN AUTO: 29 PG (ref 27–31)
MCHC RBC AUTO-ENTMCNC: 30 G/DL (ref 33–37)
MCV RBC AUTO: 97 FL (ref 78–100)
MONOCYTES # BLD: 0.9 10*3/UL (ref 0.2–0.8)
NEUTROPHILS # BLD: 4.8 10*3/UL (ref 1.4–6.5)
PLATELET # BLD AUTO: 166 K/MM3 (ref 130–400)
PMV BLD AUTO: 10 FL (ref 7.4–10.4)
POTASSIUM SERPL-SCNC: 5 MMOL/L (ref 3.5–5.1)
PROT SERPL-MCNC: 6.7 G/DL (ref 6.2–8.1)
RBC # BLD AUTO: 3.81 M/MM3 (ref 4.2–5.6)
SODIUM SERPL-SCNC: 141 MMOL/L (ref 136–145)
WBC # BLD AUTO: 7.5 K/MM3 (ref 4.8–10.8)

## 2019-11-26 NOTE — NUR
ASSMNT COMPLETE, PT HAS NO C/O PAIN OR DISCOMFORT, SLEEPING UPON ARRIVAL TO
ROOM, WAKENS EASILY BUT FALLS ASLEEP QUICKLY DURING ASSMNT, GIVEN BREATHING
TREATMENT, WAS VERY CONFUSED WHAT IT WAS FOR OR HOW TO USE IT, AFTER TAKING
MEDS STATES, "AFTER TAKING 2 PILLS, I'M A TURKEY!", UNSURE IF PT IS SERIOUS,
BUT DOES NOT SEEM FULLY ALERT, NO REQUESTS, CALL LIGHT W/I REACH

## 2019-11-26 NOTE — NUR
Patient was fit for new trilogy mask that gave him the appropriate,
comfortable fit.  Last night he was not able to keep trilogy on due to ill
fitting mask. Vilma Collins/Keith KELSEY was in to see patient earlier this morn,
she spoke to him regarding using the trilogy at night and during naps, and for
most of the day for now. She also stated that she believes an order for
trilogy for home would be considered.  Patient denies and pain, SOB or
symptoms from SOB.

## 2019-11-26 NOTE — NUR
Pt sitting in recliner. Trilogy was removed and pt placed on 3L O2 per nasal
canula. Pt states feeling much better lung sounds clear and no shortness of
breath. Pt has maintained a SaO2 above 94% during the night. Explained to pt
that if he can maintain SaO2 at 93% with no shortness of breath he can remain
on nasal canula. Pt states that he understands and is agreable to the plan. Pt
stastes he will call his wife and ask her to bring his cpap machine from home.
Pt remarks on how uncomfortabl the trilogy mask was to wear during the night.

## 2019-11-26 NOTE — NUR
Patient sitting in chair. Patient's wife present all afternoon.n Patient's
facial coloring pink, no blue lips. O2 sats maintained around 91-92% this
afternoon. Patient in bed early this afternoon for ordered echo. Unable to doc
Bipap/cpap check readings for trilogy as patient was getting an echo.
Patient moved to chair for dinner and O2 via nasal cannula at 3L.

## 2019-11-26 NOTE — NUR
Patient sitting in chair eating breakfast. Patient showed no distress or
labored breathing however, lips bluish. Educated on need to wear trilogy. O2
back up. Critical CO2 at 54. Patient was not wearing trilogy as eating.
Peng in place and urine yellow/clear. Patient shows bilateral edema,
non-pitting. Reddened bruise or scar on R shin. No pain. Patient pleasant.
Feet elevated.

## 2019-11-27 VITALS — SYSTOLIC BLOOD PRESSURE: 161 MMHG | DIASTOLIC BLOOD PRESSURE: 77 MMHG

## 2019-11-27 VITALS — SYSTOLIC BLOOD PRESSURE: 131 MMHG | DIASTOLIC BLOOD PRESSURE: 68 MMHG

## 2019-11-27 VITALS — DIASTOLIC BLOOD PRESSURE: 47 MMHG | SYSTOLIC BLOOD PRESSURE: 143 MMHG

## 2019-11-27 VITALS — DIASTOLIC BLOOD PRESSURE: 75 MMHG | SYSTOLIC BLOOD PRESSURE: 177 MMHG

## 2019-11-27 VITALS — SYSTOLIC BLOOD PRESSURE: 150 MMHG | DIASTOLIC BLOOD PRESSURE: 74 MMHG

## 2019-11-27 VITALS — DIASTOLIC BLOOD PRESSURE: 70 MMHG | SYSTOLIC BLOOD PRESSURE: 133 MMHG

## 2019-11-27 LAB
CALCIUM SERPL-MCNC: 9.1 MG/DL (ref 8.3–10.5)
CO2 SERPL-SCNC: 30 MMOL/L (ref 23–31)
GLUCOSE SERPL-MCNC: 119 MG/DL (ref 75–110)
POTASSIUM SERPL-SCNC: 5 MMOL/L (ref 3.5–5.1)
SODIUM SERPL-SCNC: 142 MMOL/L (ref 136–145)

## 2019-11-28 ENCOUNTER — HOSPITAL ENCOUNTER (INPATIENT)
Dept: HOSPITAL 6 - MED/SURG | Age: 79
LOS: 6 days | Discharge: HOME HEALTH | DRG: 947 | End: 2019-12-04
Attending: FAMILY MEDICINE | Admitting: NURSE PRACTITIONER
Payer: MEDICARE

## 2019-11-28 VITALS — DIASTOLIC BLOOD PRESSURE: 73 MMHG | SYSTOLIC BLOOD PRESSURE: 159 MMHG

## 2019-11-28 VITALS — DIASTOLIC BLOOD PRESSURE: 68 MMHG | SYSTOLIC BLOOD PRESSURE: 129 MMHG

## 2019-11-28 VITALS — DIASTOLIC BLOOD PRESSURE: 66 MMHG | SYSTOLIC BLOOD PRESSURE: 146 MMHG

## 2019-11-28 VITALS — SYSTOLIC BLOOD PRESSURE: 163 MMHG | DIASTOLIC BLOOD PRESSURE: 81 MMHG

## 2019-11-28 VITALS — WEIGHT: 275.58 LBS | HEIGHT: 67.99 IN | BODY MASS INDEX: 41.77 KG/M2

## 2019-11-28 DIAGNOSIS — E11.40: ICD-10-CM

## 2019-11-28 DIAGNOSIS — I48.91: ICD-10-CM

## 2019-11-28 DIAGNOSIS — J96.20: ICD-10-CM

## 2019-11-28 DIAGNOSIS — R53.81: Primary | ICD-10-CM

## 2019-11-28 DIAGNOSIS — N31.9: ICD-10-CM

## 2019-11-28 DIAGNOSIS — Z79.84: ICD-10-CM

## 2019-11-28 DIAGNOSIS — R00.1: ICD-10-CM

## 2019-11-28 DIAGNOSIS — E66.01: ICD-10-CM

## 2019-11-28 DIAGNOSIS — Z88.5: ICD-10-CM

## 2019-11-28 DIAGNOSIS — I50.9: ICD-10-CM

## 2019-11-28 DIAGNOSIS — N40.1: ICD-10-CM

## 2019-11-28 DIAGNOSIS — Z87.891: ICD-10-CM

## 2019-11-28 DIAGNOSIS — G95.11: ICD-10-CM

## 2019-11-28 DIAGNOSIS — Z79.82: ICD-10-CM

## 2019-11-28 DIAGNOSIS — R33.8: ICD-10-CM

## 2019-11-28 DIAGNOSIS — J44.9: ICD-10-CM

## 2019-11-28 DIAGNOSIS — Z88.8: ICD-10-CM

## 2019-11-28 DIAGNOSIS — Z88.2: ICD-10-CM

## 2019-11-28 LAB
CALCIUM SERPL-MCNC: 9.6 MG/DL (ref 8.3–10.5)
CO2 SERPL-SCNC: 28 MMOL/L (ref 23–31)
GLUCOSE SERPL-MCNC: 130 MG/DL (ref 75–110)
POTASSIUM SERPL-SCNC: 5 MMOL/L (ref 3.5–5.1)
SODIUM SERPL-SCNC: 142 MMOL/L (ref 136–145)

## 2019-11-28 NOTE — NUR
AWAKE AND SITTING IN CHAIR. OXYGEN DECREASED FROM 3LPM TO 2LPM; SATURATIONS
UPPER 90'S. USED TRILOGY DURING NIGHT AND HAS QUESTIONS REGARDING MACHINE.
EDUCATION PROVIDED. REPORTS THAT HIS PSORIASIS HAS DECREASED SINCE HOSPITAL
ADMIT. RESP EVEN AND UNLABORED. LUNG SOUNDS VERY DIMINISHED THROUGHOUT. ABD
ROUND AND TIGHT; NO EDEMA NOTED. ZULETA CATH INTACT TO DD. LE EDEMA NOTED.
STRONG PEDAL PULSES BILAT. CALL LIGHT IN REACH.

## 2019-11-28 NOTE — NUR
Q hourly checks done, has been resting in bed, eyes closed with even
respirations. Has worn trilogy mask through out the night and readjusted his
own nose mask. At 0540 Pt transfered from bed to recliner. One person assist,
gait steady, used walker and  socks on.

## 2019-11-28 NOTE — NUR
C/o of sore throat, given tylenol 1000mg PO. Continues to be on tele-monitor
and continues to wear trilogy mask with continuous oxygen bleed in. FIO2 28%.
0330 assist pt with reposition self up in bed.

## 2019-11-29 VITALS — DIASTOLIC BLOOD PRESSURE: 69 MMHG | SYSTOLIC BLOOD PRESSURE: 129 MMHG

## 2019-11-29 VITALS — DIASTOLIC BLOOD PRESSURE: 79 MMHG | SYSTOLIC BLOOD PRESSURE: 168 MMHG

## 2019-11-29 LAB
CALCIUM SERPL-MCNC: 9.2 MG/DL (ref 8.3–10.5)
CO2 SERPL-SCNC: 30 MMOL/L (ref 23–31)
GLUCOSE SERPL-MCNC: 140 MG/DL (ref 75–110)
POTASSIUM SERPL-SCNC: 4.9 MMOL/L (ref 3.5–5.1)
SODIUM SERPL-SCNC: 142 MMOL/L (ref 136–145)

## 2019-11-30 VITALS — SYSTOLIC BLOOD PRESSURE: 137 MMHG | DIASTOLIC BLOOD PRESSURE: 71 MMHG

## 2019-11-30 VITALS — DIASTOLIC BLOOD PRESSURE: 88 MMHG | SYSTOLIC BLOOD PRESSURE: 157 MMHG

## 2019-11-30 VITALS — SYSTOLIC BLOOD PRESSURE: 130 MMHG | DIASTOLIC BLOOD PRESSURE: 63 MMHG

## 2019-12-01 VITALS — DIASTOLIC BLOOD PRESSURE: 65 MMHG | SYSTOLIC BLOOD PRESSURE: 120 MMHG

## 2019-12-01 VITALS — DIASTOLIC BLOOD PRESSURE: 85 MMHG | SYSTOLIC BLOOD PRESSURE: 141 MMHG

## 2019-12-02 VITALS — DIASTOLIC BLOOD PRESSURE: 72 MMHG | SYSTOLIC BLOOD PRESSURE: 132 MMHG

## 2019-12-02 VITALS — SYSTOLIC BLOOD PRESSURE: 134 MMHG | DIASTOLIC BLOOD PRESSURE: 81 MMHG

## 2019-12-03 VITALS — DIASTOLIC BLOOD PRESSURE: 72 MMHG | SYSTOLIC BLOOD PRESSURE: 126 MMHG

## 2019-12-03 VITALS — SYSTOLIC BLOOD PRESSURE: 131 MMHG | DIASTOLIC BLOOD PRESSURE: 70 MMHG

## 2019-12-03 LAB
CALCIUM SERPL-MCNC: 9.5 MG/DL (ref 8.3–10.5)
CO2 SERPL-SCNC: 25 MMOL/L (ref 23–31)
ERYTHROCYTE [DISTWIDTH] IN BLOOD BY AUTOMATED COUNT: 14 % (ref 11.5–14.5)
GLUCOSE SERPL-MCNC: 208 MG/DL (ref 75–110)
HCT VFR BLD AUTO: 40.7 % (ref 42–52)
HGB BLD-MCNC: 12.2 G/DL (ref 13.5–18)
LYMPHOCYTES NFR BLD MANUAL: 33 % (ref 20–51)
MCH RBC QN AUTO: 28 PG (ref 27–31)
MCHC RBC AUTO-ENTMCNC: 30 G/DL (ref 33–37)
MCV RBC AUTO: 94 FL (ref 78–100)
MONOCYTES NFR BLD: 6 % (ref 3–10)
NEUTS SEG NFR BLD MANUAL: 56 % (ref 42–75)
PLATELET # BLD AUTO: 284 K/MM3 (ref 130–400)
PMV BLD AUTO: 9.7 FL (ref 7.4–10.4)
POTASSIUM SERPL-SCNC: 5 MMOL/L (ref 3.5–5.1)
RBC # BLD AUTO: 4.34 M/MM3 (ref 4.2–5.6)
SODIUM SERPL-SCNC: 138 MMOL/L (ref 136–145)
WBC # BLD AUTO: 6.7 K/MM3 (ref 4.8–10.8)

## 2019-12-04 VITALS
SYSTOLIC BLOOD PRESSURE: 135 MMHG | DIASTOLIC BLOOD PRESSURE: 70 MMHG | DIASTOLIC BLOOD PRESSURE: 70 MMHG | DIASTOLIC BLOOD PRESSURE: 70 MMHG | SYSTOLIC BLOOD PRESSURE: 135 MMHG | SYSTOLIC BLOOD PRESSURE: 135 MMHG | SYSTOLIC BLOOD PRESSURE: 135 MMHG | SYSTOLIC BLOOD PRESSURE: 135 MMHG | DIASTOLIC BLOOD PRESSURE: 70 MMHG | SYSTOLIC BLOOD PRESSURE: 135 MMHG | SYSTOLIC BLOOD PRESSURE: 135 MMHG | SYSTOLIC BLOOD PRESSURE: 135 MMHG | DIASTOLIC BLOOD PRESSURE: 70 MMHG | DIASTOLIC BLOOD PRESSURE: 70 MMHG | DIASTOLIC BLOOD PRESSURE: 70 MMHG | DIASTOLIC BLOOD PRESSURE: 70 MMHG | SYSTOLIC BLOOD PRESSURE: 135 MMHG | DIASTOLIC BLOOD PRESSURE: 70 MMHG | DIASTOLIC BLOOD PRESSURE: 70 MMHG | SYSTOLIC BLOOD PRESSURE: 135 MMHG | SYSTOLIC BLOOD PRESSURE: 135 MMHG | DIASTOLIC BLOOD PRESSURE: 70 MMHG | DIASTOLIC BLOOD PRESSURE: 70 MMHG | SYSTOLIC BLOOD PRESSURE: 135 MMHG

## 2019-12-06 ENCOUNTER — HOSPITAL ENCOUNTER (OUTPATIENT)
Dept: HOSPITAL 6 - AMSURD | Age: 79
End: 2019-12-06
Attending: PHYSICIAN ASSISTANT
Payer: MEDICARE

## 2019-12-06 DIAGNOSIS — I49.9: ICD-10-CM

## 2019-12-06 DIAGNOSIS — I47.2: Primary | ICD-10-CM

## 2019-12-06 NOTE — NUR
Pt here for EKG. Pt asks questions about whether he is to be on 2 or 3
liters of oxygen
 at home since his discharge from hospital 12/4/19.  Pt has been running 92%
on 3 l/NC at home and reports feels/doing well.  Pt encouraged to f/u with PCP
to definitive answer but to decrease oxygen to 2 L/NC if
his sp02 gets mid to high 90's (due to COPD).  Pt reports he will continue to
monitor his oxygen numbers at home.
EKG faxed to Dr. Velez's office as ordered.

## 2020-01-30 ENCOUNTER — HOSPITAL ENCOUNTER (OUTPATIENT)
Dept: HOSPITAL 6 - LAB | Age: 80
End: 2020-01-30
Attending: FAMILY MEDICINE
Payer: MEDICARE

## 2020-01-30 DIAGNOSIS — N18.3: Primary | ICD-10-CM

## 2020-01-30 DIAGNOSIS — H60.592: ICD-10-CM

## 2020-01-30 LAB
APPEARANCE UR: (no result)
BASOPHILS # BLD: 0 10*3/UL (ref 0.02–0.1)
BILIRUB UR QL STRIP.AUTO: NEGATIVE
CALCIUM SERPL-MCNC: 9.3 MG/DL (ref 8.3–10.5)
CO2 SERPL-SCNC: 27 MMOL/L (ref 23–31)
COLOR UR AUTO: YELLOW
EOSINOPHIL # BLD: 0.3 10*3/UL (ref 0.04–0.4)
EOSINOPHIL NFR BLD: 2.7 % (ref 0–4)
ERYTHROCYTE [DISTWIDTH] IN BLOOD BY AUTOMATED COUNT: 15.1 % (ref 11.5–14.5)
GLUCOSE SERPL-MCNC: 78 MG/DL (ref 75–110)
GLUCOSE UR QL STRIP.AUTO: NEGATIVE MG/DL
HCT VFR BLD AUTO: 39.6 % (ref 42–52)
HGB BLD-MCNC: 11.9 G/DL (ref 13.5–18)
KETONES UR QL STRIP.AUTO: NEGATIVE
LEUKOCYTE ESTERASE UR QL STRIP: (no result)
LYMPHOCYTES # BLD: 1.9 10*3/UL (ref 1.5–4)
MCH RBC QN AUTO: 29 PG (ref 27–31)
MCHC RBC AUTO-ENTMCNC: 30 G/DL (ref 33–37)
MCV RBC AUTO: 96 FL (ref 78–100)
MONOCYTES # BLD: 1.3 10*3/UL (ref 0.2–0.8)
MUCOUS THREADS URNS QL MICRO: PRESENT
NEUTROPHILS # BLD: 8.8 10*3/UL (ref 1.4–6.5)
NITRITE UR QL STRIP: NEGATIVE
PH UR STRIP.AUTO: 5 [PH] (ref 5–8)
PLATELET # BLD AUTO: 170 K/MM3 (ref 130–400)
PMV BLD AUTO: 10.6 FL (ref 7.4–10.4)
POTASSIUM SERPL-SCNC: 4.5 MMOL/L (ref 3.5–5.1)
PROT ?TM UR-MCNC: (no result) MG/DL
RBC # BLD AUTO: 4.13 M/MM3 (ref 4.2–5.6)
RBC UR QL AUTO: (no result)
SODIUM SERPL-SCNC: 141 MMOL/L (ref 136–145)
SP GR UR STRIP.AUTO: 1.02 (ref 1–1.03)
UROBILINOGEN UR-MCNC: NORMAL MG/DL
WBC # BLD AUTO: 12.4 K/MM3 (ref 4.8–10.8)
WBC #/AREA URNS HPF: (no result) /HPF (ref 0–3)

## 2020-02-07 ENCOUNTER — HOSPITAL ENCOUNTER (OUTPATIENT)
Dept: HOSPITAL 6 - LAB | Age: 80
End: 2020-02-07
Attending: FAMILY MEDICINE
Payer: MEDICARE

## 2020-02-07 DIAGNOSIS — I12.9: Primary | ICD-10-CM

## 2020-02-07 DIAGNOSIS — N18.3: ICD-10-CM

## 2020-02-07 LAB
BASOPHILS # BLD: 0 10*3/UL (ref 0.02–0.1)
CALCIUM SERPL-MCNC: 9 MG/DL (ref 8.3–10.5)
CO2 SERPL-SCNC: 28 MMOL/L (ref 23–31)
EOSINOPHIL # BLD: 0.5 10*3/UL (ref 0.04–0.4)
EOSINOPHIL NFR BLD: 7.8 % (ref 0–4)
ERYTHROCYTE [DISTWIDTH] IN BLOOD BY AUTOMATED COUNT: 14.2 % (ref 11.5–14.5)
GLUCOSE SERPL-MCNC: 119 MG/DL (ref 75–110)
HCT VFR BLD AUTO: 42.3 % (ref 42–52)
HGB BLD-MCNC: 12.6 G/DL (ref 13.5–18)
LYMPHOCYTES # BLD: 1.7 10*3/UL (ref 1.5–4)
MCH RBC QN AUTO: 28 PG (ref 27–31)
MCHC RBC AUTO-ENTMCNC: 30 G/DL (ref 33–37)
MCV RBC AUTO: 95 FL (ref 78–100)
MONOCYTES # BLD: 0.5 10*3/UL (ref 0.2–0.8)
NEUTROPHILS # BLD: 4 10*3/UL (ref 1.4–6.5)
PLATELET # BLD AUTO: 254 K/MM3 (ref 130–400)
PMV BLD AUTO: 9.4 FL (ref 7.4–10.4)
POTASSIUM SERPL-SCNC: 5.4 MMOL/L (ref 3.5–5.1)
RBC # BLD AUTO: 4.46 M/MM3 (ref 4.2–5.6)
SODIUM SERPL-SCNC: 143 MMOL/L (ref 136–145)
WBC # BLD AUTO: 6.7 K/MM3 (ref 4.8–10.8)

## 2020-02-21 ENCOUNTER — HOSPITAL ENCOUNTER (OUTPATIENT)
Dept: HOSPITAL 6 - LAB | Age: 80
End: 2020-02-21
Attending: FAMILY MEDICINE
Payer: MEDICARE

## 2020-02-21 DIAGNOSIS — N18.3: Primary | ICD-10-CM

## 2020-02-21 DIAGNOSIS — E87.5: ICD-10-CM

## 2020-02-21 LAB
APPEARANCE UR: (no result)
BILIRUB UR QL STRIP.AUTO: NEGATIVE
CALCIUM SERPL-MCNC: 8.8 MG/DL (ref 8.3–10.5)
CO2 SERPL-SCNC: 28 MMOL/L (ref 23–31)
COLOR UR AUTO: YELLOW
GLUCOSE SERPL-MCNC: 104 MG/DL (ref 75–110)
GLUCOSE UR QL STRIP.AUTO: NEGATIVE MG/DL
KETONES UR QL STRIP.AUTO: NEGATIVE
LEUKOCYTE ESTERASE UR QL STRIP: (no result)
MUCOUS THREADS URNS QL MICRO: PRESENT
NITRITE UR QL STRIP: POSITIVE
PH UR STRIP.AUTO: 5 [PH] (ref 5–8)
POTASSIUM SERPL-SCNC: 4.8 MMOL/L (ref 3.5–5.1)
PROT ?TM UR-MCNC: (no result) MG/DL
RBC UR QL AUTO: (no result)
SODIUM SERPL-SCNC: 142 MMOL/L (ref 136–145)
SP GR UR STRIP.AUTO: 1.02 (ref 1–1.03)
UROBILINOGEN UR-MCNC: NORMAL MG/DL
WBC #/AREA URNS HPF: >50 /HPF (ref 0–3)

## 2020-04-14 ENCOUNTER — HOSPITAL ENCOUNTER (OUTPATIENT)
Dept: HOSPITAL 6 - LAB | Age: 80
End: 2020-04-14
Attending: FAMILY MEDICINE
Payer: MEDICARE

## 2020-04-14 DIAGNOSIS — N28.9: ICD-10-CM

## 2020-04-14 DIAGNOSIS — E13.9: Primary | ICD-10-CM

## 2020-08-21 ENCOUNTER — HOSPITAL ENCOUNTER (OUTPATIENT)
Dept: HOSPITAL 6 - LAB | Age: 80
End: 2020-08-21
Attending: FAMILY MEDICINE
Payer: MEDICARE

## 2020-08-21 DIAGNOSIS — Z12.5: Primary | ICD-10-CM

## 2020-08-21 DIAGNOSIS — N19: ICD-10-CM

## 2020-08-21 DIAGNOSIS — E13.9: ICD-10-CM

## 2020-08-21 DIAGNOSIS — E78.5: ICD-10-CM

## 2020-08-21 LAB
ALBUMIN SERPL-MCNC: 3.6 G/DL (ref 3.4–4.8)
ALT SERPL-CCNC: 11 U/L (ref 0–55)
AST SERPL-CCNC: 11 U/L (ref 5–34)
BILIRUB SERPL-MCNC: 0.3 MG/DL (ref 0.2–1.2)
CALCIUM SERPL-MCNC: 9.3 MG/DL (ref 8.3–10.5)
CO2 SERPL-SCNC: 27 MMOL/L (ref 23–31)
CREAT UR-MCNC: 45 MG/DL
GLUCOSE SERPL-MCNC: 94 MG/DL (ref 75–110)
POTASSIUM SERPL-SCNC: 5.3 MMOL/L (ref 3.5–5.1)
PROT SERPL-MCNC: 7.2 G/DL (ref 6.2–8.1)
SODIUM SERPL-SCNC: 143 MMOL/L (ref 136–145)

## 2020-09-05 ENCOUNTER — HOSPITAL ENCOUNTER (OUTPATIENT)
Dept: HOSPITAL 6 - LAB | Age: 80
End: 2020-09-05
Attending: PHYSICIAN ASSISTANT
Payer: MEDICARE

## 2020-09-05 DIAGNOSIS — N39.0: Primary | ICD-10-CM

## 2020-09-05 LAB
ALBUMIN SERPL-MCNC: 3.4 G/DL (ref 3.4–4.8)
ALT SERPL-CCNC: 8 U/L (ref 0–55)
AST SERPL-CCNC: 10 U/L (ref 5–34)
BASOPHILS # BLD: 0 10*3/UL (ref 0.02–0.1)
BILIRUB SERPL-MCNC: 0.4 MG/DL (ref 0.2–1.2)
CALCIUM SERPL-MCNC: 9.1 MG/DL (ref 8.3–10.5)
CO2 SERPL-SCNC: 25 MMOL/L (ref 23–31)
EOSINOPHIL # BLD: 0.1 10*3/UL (ref 0.04–0.4)
EOSINOPHIL NFR BLD: 1 % (ref 0–4)
ERYTHROCYTE [DISTWIDTH] IN BLOOD BY AUTOMATED COUNT: 14.4 % (ref 11.5–14.5)
GLUCOSE SERPL-MCNC: 104 MG/DL (ref 75–110)
HCT VFR BLD AUTO: 39.8 % (ref 42–52)
HGB BLD-MCNC: 12 G/DL (ref 13.5–18)
LYMPHOCYTES # BLD: 1.7 10*3/UL (ref 1.5–4)
MCH RBC QN AUTO: 29 PG (ref 27–31)
MCHC RBC AUTO-ENTMCNC: 30 G/DL (ref 33–37)
MCV RBC AUTO: 97 FL (ref 78–100)
MONOCYTES # BLD: 1.2 10*3/UL (ref 0.2–0.8)
NEUTROPHILS # BLD: 9.2 10*3/UL (ref 1.4–6.5)
PLATELET # BLD AUTO: 202 K/MM3 (ref 130–400)
PMV BLD AUTO: 9.6 FL (ref 7.4–10.4)
POTASSIUM SERPL-SCNC: 5 MMOL/L (ref 3.5–5.1)
PROT SERPL-MCNC: 6.7 G/DL (ref 6.2–8.1)
RBC # BLD AUTO: 4.1 M/MM3 (ref 4.2–5.6)
SODIUM SERPL-SCNC: 139 MMOL/L (ref 136–145)
WBC # BLD AUTO: 12.2 K/MM3 (ref 4.8–10.8)

## 2020-09-23 ENCOUNTER — HOSPITAL ENCOUNTER (OUTPATIENT)
Dept: HOSPITAL 6 - MSO | Age: 80
End: 2020-09-23
Payer: MEDICARE

## 2020-09-23 DIAGNOSIS — Z85.828: ICD-10-CM

## 2020-09-23 DIAGNOSIS — J44.9: ICD-10-CM

## 2020-09-23 DIAGNOSIS — E11.36: Primary | ICD-10-CM

## 2020-09-23 DIAGNOSIS — N18.9: ICD-10-CM

## 2020-09-23 DIAGNOSIS — I48.91: ICD-10-CM

## 2020-09-23 DIAGNOSIS — I13.0: ICD-10-CM

## 2020-09-23 DIAGNOSIS — Z88.5: ICD-10-CM

## 2020-09-23 DIAGNOSIS — Q25.6: ICD-10-CM

## 2020-09-23 DIAGNOSIS — I50.9: ICD-10-CM

## 2020-09-23 DIAGNOSIS — H25.11: ICD-10-CM

## 2020-09-23 DIAGNOSIS — Z79.84: ICD-10-CM

## 2020-09-23 DIAGNOSIS — E11.22: ICD-10-CM

## 2020-09-23 DIAGNOSIS — G47.33: ICD-10-CM

## 2020-09-23 DIAGNOSIS — M19.90: ICD-10-CM

## 2020-09-23 DIAGNOSIS — Z79.82: ICD-10-CM

## 2020-09-23 DIAGNOSIS — E78.00: ICD-10-CM

## 2020-09-23 DIAGNOSIS — Z88.8: ICD-10-CM

## 2020-09-23 PROCEDURE — V2632 POST CHMBR INTRAOCULAR LENS: HCPCS

## 2020-09-28 ENCOUNTER — HOSPITAL ENCOUNTER (OUTPATIENT)
Dept: HOSPITAL 6 - LAB | Age: 80
End: 2020-09-28
Attending: FAMILY MEDICINE
Payer: MEDICARE

## 2020-09-28 DIAGNOSIS — N19: Primary | ICD-10-CM

## 2020-09-28 LAB
APPEARANCE UR: (no result)
BILIRUB UR QL STRIP.AUTO: NEGATIVE
COLOR UR AUTO: YELLOW
GLUCOSE UR QL STRIP.AUTO: NEGATIVE MG/DL
KETONES UR QL STRIP.AUTO: NEGATIVE
LEUKOCYTE ESTERASE UR QL STRIP: (no result)
MUCOUS THREADS URNS QL MICRO: PRESENT
NITRITE UR QL STRIP: POSITIVE
PH UR STRIP.AUTO: 6 [PH] (ref 5–8)
PROT ?TM UR-MCNC: (no result) MG/DL
RBC UR QL AUTO: (no result)
SP GR UR STRIP.AUTO: 1.03 (ref 1–1.03)
UROBILINOGEN UR-MCNC: NORMAL MG/DL
WBC #/AREA URNS HPF: >50 /HPF (ref 0–3)

## 2020-10-01 ENCOUNTER — HOSPITAL ENCOUNTER (OUTPATIENT)
Dept: HOSPITAL 6 - LAB | Age: 80
End: 2020-10-01
Attending: FAMILY MEDICINE
Payer: MEDICARE

## 2020-10-01 DIAGNOSIS — R39.9: Primary | ICD-10-CM

## 2020-10-01 LAB
APPEARANCE UR: CLEAR
BILIRUB UR QL STRIP.AUTO: NEGATIVE
COLOR UR AUTO: YELLOW
GLUCOSE UR QL STRIP.AUTO: NEGATIVE MG/DL
KETONES UR QL STRIP.AUTO: NEGATIVE
LEUKOCYTE ESTERASE UR QL STRIP: (no result)
MUCOUS THREADS URNS QL MICRO: PRESENT
NITRITE UR QL STRIP: POSITIVE
PH UR STRIP.AUTO: 5 [PH] (ref 5–8)
PROT ?TM UR-MCNC: (no result) MG/DL
RBC UR QL AUTO: NEGATIVE
SP GR UR STRIP.AUTO: 1.03 (ref 1–1.03)
UROBILINOGEN UR-MCNC: NORMAL MG/DL
WBC #/AREA URNS HPF: (no result) /HPF (ref 0–3)

## 2020-10-07 ENCOUNTER — HOSPITAL ENCOUNTER (OUTPATIENT)
Dept: HOSPITAL 6 - LAB | Age: 80
End: 2020-10-07
Attending: FAMILY MEDICINE
Payer: MEDICARE

## 2020-10-07 DIAGNOSIS — R39.9: Primary | ICD-10-CM

## 2020-10-07 LAB
APPEARANCE UR: (no result)
BILIRUB UR QL STRIP.AUTO: NEGATIVE
COLOR UR AUTO: YELLOW
GLUCOSE UR QL STRIP.AUTO: NEGATIVE MG/DL
KETONES UR QL STRIP.AUTO: NEGATIVE
LEUKOCYTE ESTERASE UR QL STRIP: (no result)
NITRITE UR QL STRIP: POSITIVE
PH UR STRIP.AUTO: 5.5 [PH] (ref 5–8)
PROT ?TM UR-MCNC: (no result) MG/DL
RBC UR QL AUTO: (no result)
SP GR UR STRIP.AUTO: 1.02 (ref 1–1.03)
UROBILINOGEN UR-MCNC: NORMAL MG/DL
WBC #/AREA URNS HPF: (no result) /HPF (ref 0–3)

## 2020-10-08 ENCOUNTER — HOSPITAL ENCOUNTER (OUTPATIENT)
Dept: HOSPITAL 6 - LAB | Age: 80
End: 2020-10-08
Attending: PHYSICIAN ASSISTANT
Payer: MEDICARE

## 2020-10-08 VITALS
SYSTOLIC BLOOD PRESSURE: 153 MMHG | SYSTOLIC BLOOD PRESSURE: 153 MMHG | DIASTOLIC BLOOD PRESSURE: 73 MMHG | DIASTOLIC BLOOD PRESSURE: 73 MMHG

## 2020-10-08 DIAGNOSIS — N19: Primary | ICD-10-CM

## 2020-10-08 LAB
CALCIUM SERPL-MCNC: 8.9 MG/DL (ref 8.3–10.5)
CO2 SERPL-SCNC: 24 MMOL/L (ref 23–31)
GLUCOSE SERPL-MCNC: 178 MG/DL (ref 75–110)
POTASSIUM SERPL-SCNC: 4.8 MMOL/L (ref 3.5–5.1)
SODIUM SERPL-SCNC: 140 MMOL/L (ref 136–145)

## 2020-10-09 VITALS — DIASTOLIC BLOOD PRESSURE: 91 MMHG | SYSTOLIC BLOOD PRESSURE: 156 MMHG

## 2020-10-10 VITALS — SYSTOLIC BLOOD PRESSURE: 145 MMHG | DIASTOLIC BLOOD PRESSURE: 68 MMHG

## 2020-10-11 VITALS — SYSTOLIC BLOOD PRESSURE: 146 MMHG | DIASTOLIC BLOOD PRESSURE: 65 MMHG

## 2020-10-12 VITALS — SYSTOLIC BLOOD PRESSURE: 136 MMHG | DIASTOLIC BLOOD PRESSURE: 62 MMHG

## 2020-10-13 VITALS
DIASTOLIC BLOOD PRESSURE: 52 MMHG | DIASTOLIC BLOOD PRESSURE: 52 MMHG | SYSTOLIC BLOOD PRESSURE: 145 MMHG | SYSTOLIC BLOOD PRESSURE: 145 MMHG

## 2020-10-14 ENCOUNTER — HOSPITAL ENCOUNTER (OUTPATIENT)
Dept: HOSPITAL 6 - AMSURD | Age: 80
End: 2020-10-14
Attending: FAMILY MEDICINE
Payer: MEDICARE

## 2020-10-14 ENCOUNTER — HOSPITAL ENCOUNTER (OUTPATIENT)
Dept: HOSPITAL 6 - LAB | Age: 80
End: 2020-10-14
Attending: FAMILY MEDICINE
Payer: MEDICARE

## 2020-10-14 VITALS
SYSTOLIC BLOOD PRESSURE: 151 MMHG | DIASTOLIC BLOOD PRESSURE: 74 MMHG | DIASTOLIC BLOOD PRESSURE: 74 MMHG | SYSTOLIC BLOOD PRESSURE: 151 MMHG

## 2020-10-14 VITALS — WEIGHT: 275.58 LBS | BODY MASS INDEX: 41.77 KG/M2 | HEIGHT: 67.99 IN

## 2020-10-14 DIAGNOSIS — N39.0: Primary | ICD-10-CM

## 2020-10-14 DIAGNOSIS — N19: ICD-10-CM

## 2020-10-14 DIAGNOSIS — Z79.2: ICD-10-CM

## 2020-10-14 LAB
APPEARANCE UR: CLEAR
BILIRUB UR QL STRIP.AUTO: NEGATIVE
COLOR UR AUTO: YELLOW
GLUCOSE UR QL STRIP.AUTO: NEGATIVE MG/DL
KETONES UR QL STRIP.AUTO: NEGATIVE
LEUKOCYTE ESTERASE UR QL STRIP: NEGATIVE
NITRITE UR QL STRIP: NEGATIVE
PH UR STRIP.AUTO: 5 [PH] (ref 5–8)
PROT ?TM UR-MCNC: (no result) MG/DL
RBC UR QL AUTO: (no result)
SP GR UR STRIP.AUTO: 1.02 (ref 1–1.03)
UROBILINOGEN UR-MCNC: NORMAL MG/DL
WBC #/AREA URNS HPF: (no result) /HPF (ref 0–3)

## 2020-11-19 ENCOUNTER — HOSPITAL ENCOUNTER (OUTPATIENT)
Dept: HOSPITAL 6 - LAB | Age: 80
End: 2020-11-19
Attending: FAMILY MEDICINE
Payer: MEDICARE

## 2020-11-19 DIAGNOSIS — N39.0: Primary | ICD-10-CM

## 2020-11-19 LAB
APPEARANCE UR: (no result)
BILIRUB UR QL STRIP.AUTO: NEGATIVE
COLOR UR AUTO: (no result)
GLUCOSE UR QL STRIP.AUTO: NEGATIVE MG/DL
KETONES UR QL STRIP.AUTO: NEGATIVE
LEUKOCYTE ESTERASE UR QL STRIP: (no result)
NITRITE UR QL STRIP: POSITIVE
PH UR STRIP.AUTO: 5 [PH] (ref 5–8)
PROT ?TM UR-MCNC: (no result) MG/DL
RBC UR QL AUTO: (no result)
SP GR UR STRIP.AUTO: 1.02 (ref 1–1.03)
UROBILINOGEN UR-MCNC: (no result) MG/DL
WBC #/AREA URNS HPF: (no result) /HPF (ref 0–3)

## 2021-01-25 NOTE — NUR
Pt was seen at [facility] ER for exacerbation of copd     Pt wants to discuss     Please call pt    SPOKE WITH DR. VILLALOBOS OVER THE PHONE REGARDING METFORMIN ORDER AS BOTH 1000MG
AND 500MG DOSES WERE ORDERED. DR. VILLALOBOS STATED THAT HE WOULD LIKE HIM TO TAKE
THE 500MG DOSE AND WILL STOP THE 1000MG DOSE.

## 2021-04-18 ENCOUNTER — HOSPITAL ENCOUNTER (EMERGENCY)
Dept: HOSPITAL 6 - ED | Age: 81
Discharge: HOME | End: 2021-04-18
Payer: MEDICARE

## 2021-04-18 VITALS
SYSTOLIC BLOOD PRESSURE: 115 MMHG | DIASTOLIC BLOOD PRESSURE: 64 MMHG | SYSTOLIC BLOOD PRESSURE: 115 MMHG | DIASTOLIC BLOOD PRESSURE: 64 MMHG

## 2021-04-18 DIAGNOSIS — Z79.51: ICD-10-CM

## 2021-04-18 DIAGNOSIS — Z79.899: ICD-10-CM

## 2021-04-18 DIAGNOSIS — R82.993: ICD-10-CM

## 2021-04-18 DIAGNOSIS — Z79.82: ICD-10-CM

## 2021-04-18 DIAGNOSIS — E11.9: ICD-10-CM

## 2021-04-18 DIAGNOSIS — I10: ICD-10-CM

## 2021-04-18 DIAGNOSIS — Z79.84: ICD-10-CM

## 2021-04-18 DIAGNOSIS — M79.644: Primary | ICD-10-CM

## 2021-04-18 DIAGNOSIS — H60.92: ICD-10-CM

## 2021-04-18 LAB
BASOPHILS # BLD: 0 10*3/UL (ref 0.02–0.1)
EOSINOPHIL # BLD: 0.4 10*3/UL (ref 0.04–0.4)
EOSINOPHIL NFR BLD: 6.3 % (ref 0–4)
ERYTHROCYTE [DISTWIDTH] IN BLOOD BY AUTOMATED COUNT: 14.2 % (ref 11.5–14.5)
HCT VFR BLD AUTO: 41.2 % (ref 42–52)
HGB BLD-MCNC: 12.2 G/DL (ref 13.5–18)
LYMPHOCYTES # BLD: 1.6 10*3/UL (ref 1.5–4)
MCH RBC QN AUTO: 29 PG (ref 27–31)
MCHC RBC AUTO-ENTMCNC: 30 G/DL (ref 33–37)
MCV RBC AUTO: 97 FL (ref 78–100)
MONOCYTES # BLD: 0.6 10*3/UL (ref 0.2–0.8)
NEUTROPHILS # BLD: 4.2 10*3/UL (ref 1.4–6.5)
PLATELET # BLD AUTO: 214 K/MM3 (ref 130–400)
PMV BLD AUTO: 9.9 FL (ref 7.4–10.4)
RBC # BLD AUTO: 4.24 M/MM3 (ref 4.2–5.6)
WBC # BLD AUTO: 6.9 K/MM3 (ref 4.8–10.8)

## 2021-05-18 ENCOUNTER — HOSPITAL ENCOUNTER (OUTPATIENT)
Dept: HOSPITAL 6 - LAB | Age: 81
End: 2021-05-18
Attending: FAMILY MEDICINE
Payer: MEDICARE

## 2021-05-18 DIAGNOSIS — M16.0: ICD-10-CM

## 2021-05-18 DIAGNOSIS — E11.22: Primary | ICD-10-CM

## 2021-05-18 DIAGNOSIS — M47.816: ICD-10-CM

## 2021-05-18 DIAGNOSIS — N18.9: ICD-10-CM

## 2021-05-18 LAB
ALBUMIN SERPL-MCNC: 3.6 G/DL (ref 3.4–4.8)
ALT SERPL-CCNC: 10 U/L (ref 0–55)
AST SERPL-CCNC: 11 U/L (ref 5–34)
BASOPHILS # BLD: 0.04 10*3/UL (ref 0.02–0.1)
BILIRUB SERPL-MCNC: 0.4 MG/DL (ref 0.2–1.2)
CALCIUM SERPL-MCNC: 9.2 MG/DL (ref 8.3–10.5)
CO2 SERPL-SCNC: 27 MMOL/L (ref 23–31)
EOSINOPHIL # BLD: 0.3 10*3/UL (ref 0.04–0.4)
EOSINOPHIL NFR BLD: 4.8 % (ref 0–4)
ERYTHROCYTE [DISTWIDTH] IN BLOOD BY AUTOMATED COUNT: 13.7 % (ref 11.5–14.5)
GLUCOSE SERPL-MCNC: 239 MG/DL (ref 75–110)
HCT VFR BLD AUTO: 40.9 % (ref 42–52)
HGB BLD-MCNC: 12.8 G/DL (ref 13.5–18)
LYMPHOCYTES # BLD: 1.53 10*3/UL (ref 1.5–4)
MCH RBC QN AUTO: 30 PG (ref 27–31)
MCHC RBC AUTO-ENTMCNC: 31 G/DL (ref 33–37)
MCV RBC AUTO: 96 FL (ref 78–100)
MONOCYTES # BLD: 0.58 10*3/UL (ref 0.2–0.8)
NEUTROPHILS # BLD: 3.76 10*3/UL (ref 1.4–6.5)
PLATELET # BLD AUTO: 192 K/MM3 (ref 130–400)
PMV BLD AUTO: 9.9 FL (ref 7.4–10.4)
POTASSIUM SERPL-SCNC: 5.6 MMOL/L (ref 3.5–5.1)
PROT SERPL-MCNC: 6.9 G/DL (ref 6.2–8.1)
RBC # BLD AUTO: 4.27 M/MM3 (ref 4.2–5.6)
SODIUM SERPL-SCNC: 142 MMOL/L (ref 136–145)
WBC # BLD AUTO: 6.2 K/MM3 (ref 4.8–10.8)

## 2021-05-19 LAB — CREAT UR-MCNC: 30 MG/DL

## 2021-08-02 ENCOUNTER — HOSPITAL ENCOUNTER (EMERGENCY)
Dept: HOSPITAL 6 - ED | Age: 81
LOS: 1 days | Discharge: ANOTHER HOSPITAL | End: 2021-08-03
Payer: MEDICARE

## 2021-08-02 DIAGNOSIS — Z79.84: ICD-10-CM

## 2021-08-02 DIAGNOSIS — I27.20: ICD-10-CM

## 2021-08-02 DIAGNOSIS — N17.9: ICD-10-CM

## 2021-08-02 DIAGNOSIS — E11.22: ICD-10-CM

## 2021-08-02 DIAGNOSIS — Z79.82: ICD-10-CM

## 2021-08-02 DIAGNOSIS — I13.0: ICD-10-CM

## 2021-08-02 DIAGNOSIS — E66.01: ICD-10-CM

## 2021-08-02 DIAGNOSIS — I50.9: ICD-10-CM

## 2021-08-02 DIAGNOSIS — J18.1: ICD-10-CM

## 2021-08-02 DIAGNOSIS — J44.9: ICD-10-CM

## 2021-08-02 DIAGNOSIS — N18.9: ICD-10-CM

## 2021-08-02 DIAGNOSIS — E87.5: ICD-10-CM

## 2021-08-02 DIAGNOSIS — Z20.822: ICD-10-CM

## 2021-08-02 DIAGNOSIS — Z79.899: ICD-10-CM

## 2021-08-02 DIAGNOSIS — N39.0: Primary | ICD-10-CM

## 2021-08-02 LAB
ALBUMIN SERPL-MCNC: 3.3 G/DL (ref 3.4–4.8)
ALT SERPL-CCNC: 11 U/L (ref 0–55)
APPEARANCE UR: (no result)
AST SERPL-CCNC: 13 U/L (ref 5–34)
BASOPHILS # BLD: 0.05 10*3/UL (ref 0.02–0.1)
BILIRUB SERPL-MCNC: 0.3 MG/DL (ref 0.2–1.2)
BILIRUB UR QL STRIP.AUTO: NEGATIVE
CALCIUM SERPL-MCNC: 8.8 MG/DL (ref 8.3–10.5)
CO2 SERPL-SCNC: 26 MMOL/L (ref 23–31)
COLOR UR AUTO: YELLOW
EOSINOPHIL # BLD: 0.27 10*3/UL (ref 0.04–0.4)
EOSINOPHIL NFR BLD: 3.9 % (ref 0–4)
ERYTHROCYTE [DISTWIDTH] IN BLOOD BY AUTOMATED COUNT: 14.5 % (ref 11.5–14.5)
GLUCOSE SERPL-MCNC: 118 MG/DL (ref 75–110)
GLUCOSE UR QL STRIP.AUTO: NEGATIVE MG/DL
HCT VFR BLD AUTO: 39.9 % (ref 42–52)
HGB BLD-MCNC: 11.9 G/DL (ref 13.5–18)
KETONES UR QL STRIP.AUTO: NEGATIVE
LEUKOCYTE ESTERASE UR QL STRIP: (no result)
LYMPHOCYTES # BLD: 1.33 10*3/UL (ref 1.5–4)
MCH RBC QN AUTO: 30 PG (ref 27–31)
MCHC RBC AUTO-ENTMCNC: 30 G/DL (ref 33–37)
MCV RBC AUTO: 101 FL (ref 78–100)
MONOCYTES # BLD: 0.73 10*3/UL (ref 0.2–0.8)
NEUTROPHILS # BLD: 4.55 10*3/UL (ref 1.4–6.5)
NITRITE UR QL STRIP: NEGATIVE
PH UR STRIP.AUTO: 5 [PH] (ref 5–8)
PLATELET # BLD AUTO: 211 K/MM3 (ref 130–400)
PMV BLD AUTO: 9.1 FL (ref 7.4–10.4)
POTASSIUM SERPL-SCNC: 7.4 MMOL/L (ref 3.5–5.1)
PROT ?TM UR-MCNC: (no result) MG/DL
PROT SERPL-MCNC: 6.8 G/DL (ref 6.2–8.1)
RBC # BLD AUTO: 3.97 M/MM3 (ref 4.2–5.6)
RBC UR QL AUTO: NEGATIVE
SODIUM SERPL-SCNC: 134 MMOL/L (ref 136–145)
SP GR UR STRIP.AUTO: 1.01 (ref 1–1.03)
UROBILINOGEN UR-MCNC: NORMAL MG/DL
WBC # BLD AUTO: 7 K/MM3 (ref 4.8–10.8)
WBC #/AREA URNS HPF: (no result) /HPF (ref 0–3)

## 2021-08-02 PROCEDURE — A4340 INDWELLING CATHETER SPECIAL: HCPCS

## 2021-08-03 VITALS — DIASTOLIC BLOOD PRESSURE: 74 MMHG | SYSTOLIC BLOOD PRESSURE: 166 MMHG

## 2021-08-03 LAB
ALBUMIN SERPL-MCNC: 3.5 G/DL (ref 3.4–4.8)
ALT SERPL-CCNC: 11 U/L (ref 0–55)
AST SERPL-CCNC: 14 U/L (ref 5–34)
BILIRUB SERPL-MCNC: 0.3 MG/DL (ref 0.2–1.2)
CALCIUM SERPL-MCNC: 8.8 MG/DL (ref 8.3–10.5)
CO2 SERPL-SCNC: 25 MMOL/L (ref 23–31)
GLUCOSE SERPL-MCNC: 244 MG/DL (ref 75–110)
POTASSIUM SERPL-SCNC: 7.3 MMOL/L (ref 3.5–5.1)
PROT SERPL-MCNC: 7.2 G/DL (ref 6.2–8.1)
SODIUM SERPL-SCNC: 133 MMOL/L (ref 136–145)

## 2021-08-23 ENCOUNTER — HOSPITAL ENCOUNTER (OUTPATIENT)
Dept: HOSPITAL 6 - PT | Age: 81
LOS: 90 days | End: 2021-11-21
Payer: MEDICARE

## 2021-08-23 DIAGNOSIS — Z79.899: Primary | ICD-10-CM

## 2021-09-07 ENCOUNTER — HOSPITAL ENCOUNTER (OUTPATIENT)
Dept: HOSPITAL 6 - LAB | Age: 81
End: 2021-09-07
Attending: FAMILY MEDICINE
Payer: MEDICARE

## 2021-09-07 DIAGNOSIS — N39.0: Primary | ICD-10-CM

## 2021-09-07 LAB
APPEARANCE UR: (no result)
BILIRUB UR QL STRIP.AUTO: NEGATIVE
COLOR UR AUTO: YELLOW
GLUCOSE UR QL STRIP.AUTO: NEGATIVE MG/DL
KETONES UR QL STRIP.AUTO: NEGATIVE
LEUKOCYTE ESTERASE UR QL STRIP: (no result)
MUCOUS THREADS URNS QL MICRO: PRESENT
NITRITE UR QL STRIP: NEGATIVE
PH UR STRIP.AUTO: 5 [PH] (ref 5–8)
PROT ?TM UR-MCNC: (no result) MG/DL
RBC UR QL AUTO: NEGATIVE
SP GR UR STRIP.AUTO: 1.01 (ref 1–1.03)
UROBILINOGEN UR-MCNC: NORMAL MG/DL
WBC #/AREA URNS HPF: (no result) /HPF (ref 0–3)

## 2021-09-12 ENCOUNTER — HOSPITAL ENCOUNTER (EMERGENCY)
Dept: HOSPITAL 6 - ED | Age: 81
Discharge: HOME | End: 2021-09-12
Payer: MEDICARE

## 2021-09-12 VITALS — WEIGHT: 280.43 LBS | HEIGHT: 65.98 IN | BODY MASS INDEX: 45.07 KG/M2

## 2021-09-12 VITALS — DIASTOLIC BLOOD PRESSURE: 61 MMHG | SYSTOLIC BLOOD PRESSURE: 109 MMHG

## 2021-09-12 DIAGNOSIS — Z88.2: ICD-10-CM

## 2021-09-12 DIAGNOSIS — Z79.899: ICD-10-CM

## 2021-09-12 DIAGNOSIS — K59.00: ICD-10-CM

## 2021-09-12 DIAGNOSIS — I10: ICD-10-CM

## 2021-09-12 DIAGNOSIS — N18.9: ICD-10-CM

## 2021-09-12 DIAGNOSIS — J44.9: ICD-10-CM

## 2021-09-12 DIAGNOSIS — Z79.84: ICD-10-CM

## 2021-09-12 DIAGNOSIS — N39.0: Primary | ICD-10-CM

## 2021-09-12 DIAGNOSIS — E11.22: ICD-10-CM

## 2021-09-12 LAB
ALBUMIN SERPL-MCNC: 3.2 G/DL (ref 3.4–4.8)
ALT SERPL-CCNC: 9 U/L (ref 0–55)
AST SERPL-CCNC: 10 U/L (ref 5–34)
BASOPHILS # BLD: 0.06 10*3/UL (ref 0.02–0.1)
BILIRUB SERPL-MCNC: 0.3 MG/DL (ref 0.2–1.2)
CALCIUM SERPL-MCNC: 9.8 MG/DL (ref 8.3–10.5)
CO2 SERPL-SCNC: 25 MMOL/L (ref 23–31)
EOSINOPHIL # BLD: 0.26 10*3/UL (ref 0.04–0.4)
EOSINOPHIL NFR BLD: 2.8 % (ref 0–4)
ERYTHROCYTE [DISTWIDTH] IN BLOOD BY AUTOMATED COUNT: 12.9 % (ref 11.5–14.5)
GLUCOSE SERPL-MCNC: 228 MG/DL (ref 75–110)
HCT VFR BLD AUTO: 39.3 % (ref 42–52)
HGB BLD-MCNC: 12.3 G/DL (ref 13.5–18)
LYMPHOCYTES # BLD: 1.39 10*3/UL (ref 1.5–4)
MCH RBC QN AUTO: 30 PG (ref 27–31)
MCHC RBC AUTO-ENTMCNC: 31 G/DL (ref 33–37)
MCV RBC AUTO: 97 FL (ref 78–100)
MONOCYTES # BLD: 0.87 10*3/UL (ref 0.2–0.8)
NEUTROPHILS # BLD: 6.64 10*3/UL (ref 1.4–6.5)
PLATELET # BLD AUTO: 234 K/MM3 (ref 130–400)
PMV BLD AUTO: 9.3 FL (ref 7.4–10.4)
POTASSIUM SERPL-SCNC: 4.8 MMOL/L (ref 3.5–5.1)
PROT SERPL-MCNC: 6.6 G/DL (ref 6.2–8.1)
RBC # BLD AUTO: 4.07 M/MM3 (ref 4.2–5.6)
SODIUM SERPL-SCNC: 138 MMOL/L (ref 136–145)
WBC # BLD AUTO: 9.2 K/MM3 (ref 4.8–10.8)

## 2021-09-13 ENCOUNTER — HOSPITAL ENCOUNTER (OUTPATIENT)
Dept: HOSPITAL 6 - LAB | Age: 81
End: 2021-09-13
Attending: FAMILY MEDICINE
Payer: MEDICARE

## 2021-09-13 DIAGNOSIS — N39.0: Primary | ICD-10-CM

## 2021-09-13 LAB
APPEARANCE UR: CLEAR
BILIRUB UR QL STRIP.AUTO: NEGATIVE
COLOR UR AUTO: YELLOW
GLUCOSE UR QL STRIP.AUTO: NEGATIVE MG/DL
KETONES UR QL STRIP.AUTO: NEGATIVE
LEUKOCYTE ESTERASE UR QL STRIP: (no result)
MUCOUS THREADS URNS QL MICRO: PRESENT
NITRITE UR QL STRIP: NEGATIVE
PH UR STRIP.AUTO: 5 [PH] (ref 5–8)
PROT ?TM UR-MCNC: (no result) MG/DL
RBC UR QL AUTO: NEGATIVE
SP GR UR STRIP.AUTO: 1.01 (ref 1–1.03)
UROBILINOGEN UR-MCNC: NORMAL MG/DL
WBC #/AREA URNS HPF: (no result) /HPF (ref 0–3)

## 2021-10-08 ENCOUNTER — HOSPITAL ENCOUNTER (OUTPATIENT)
Dept: HOSPITAL 6 - LAB | Age: 81
End: 2021-10-08
Attending: FAMILY MEDICINE
Payer: MEDICARE

## 2021-10-08 DIAGNOSIS — N39.0: Primary | ICD-10-CM

## 2021-10-20 ENCOUNTER — HOSPITAL ENCOUNTER (OUTPATIENT)
Dept: HOSPITAL 6 - LAB | Age: 81
End: 2021-10-20
Attending: FAMILY MEDICINE
Payer: MEDICARE

## 2021-10-20 DIAGNOSIS — N39.0: Primary | ICD-10-CM

## 2021-10-20 LAB
APPEARANCE UR: CLEAR
BILIRUB UR QL STRIP.AUTO: NEGATIVE
COLOR UR AUTO: YELLOW
GLUCOSE UR QL STRIP.AUTO: NEGATIVE MG/DL
KETONES UR QL STRIP.AUTO: NEGATIVE
LEUKOCYTE ESTERASE UR QL STRIP: NEGATIVE
MUCOUS THREADS URNS QL MICRO: PRESENT
NITRITE UR QL STRIP: NEGATIVE
PH UR STRIP.AUTO: 5 [PH] (ref 5–8)
PROT ?TM UR-MCNC: (no result) MG/DL
RBC UR QL AUTO: NEGATIVE
SP GR UR STRIP.AUTO: 1.01 (ref 1–1.03)
UROBILINOGEN UR-MCNC: NORMAL MG/DL
WBC #/AREA URNS HPF: (no result) /HPF (ref 0–3)

## 2021-11-02 ENCOUNTER — HOSPITAL ENCOUNTER (OUTPATIENT)
Dept: HOSPITAL 6 - LAB | Age: 81
End: 2021-11-02
Attending: FAMILY MEDICINE
Payer: MEDICARE

## 2021-11-02 DIAGNOSIS — N39.0: Primary | ICD-10-CM

## 2021-11-17 ENCOUNTER — HOSPITAL ENCOUNTER (OUTPATIENT)
Dept: HOSPITAL 6 - LAB | Age: 81
End: 2021-11-17
Payer: MEDICARE

## 2021-11-17 DIAGNOSIS — N18.30: ICD-10-CM

## 2021-11-17 DIAGNOSIS — N39.0: ICD-10-CM

## 2021-11-17 DIAGNOSIS — I12.9: Primary | ICD-10-CM

## 2021-11-17 LAB
ALBUMIN SERPL-MCNC: 3.8 G/DL (ref 3.4–4.8)
APPEARANCE UR: CLEAR
BASOPHILS # BLD: 0.04 K/MM3 (ref 0.02–0.1)
BILIRUB UR QL STRIP.AUTO: NEGATIVE
CALCIUM SERPL-MCNC: 9.3 MG/DL (ref 8.3–10.5)
CO2 SERPL-SCNC: 24 MMOL/L (ref 23–31)
COLOR UR AUTO: YELLOW
EOSINOPHIL # BLD: 0.28 K/MM3 (ref 0.04–0.4)
EOSINOPHIL NFR BLD: 4.2 % (ref 0–4)
ERYTHROCYTE [DISTWIDTH] IN BLOOD BY AUTOMATED COUNT: 13.8 % (ref 11.5–14.5)
GLUCOSE SERPL-MCNC: 107 MG/DL (ref 75–110)
GLUCOSE UR QL STRIP.AUTO: NEGATIVE MG/DL
HCT VFR BLD AUTO: 41.3 % (ref 42–52)
HGB BLD-MCNC: 12.7 G/DL (ref 13.5–18)
KETONES UR QL STRIP.AUTO: NEGATIVE
LEUKOCYTE ESTERASE UR QL STRIP: (no result)
LYMPHOCYTES # BLD: 1.79 K/MM3 (ref 1.5–4)
MCH RBC QN AUTO: 29 PG (ref 27–31)
MCHC RBC AUTO-ENTMCNC: 31 G/DL (ref 33–37)
MCV RBC AUTO: 95 FL (ref 78–100)
MONOCYTES # BLD: 0.65 K/MM3 (ref 0.2–0.8)
NEUTROPHILS # BLD: 3.9 K/MM3 (ref 1.4–6.5)
NITRITE UR QL STRIP: NEGATIVE
PH UR STRIP.AUTO: 5 [PH] (ref 5–8)
PLATELET # BLD AUTO: 210 K/MM3 (ref 130–400)
PMV BLD AUTO: 9.7 FL (ref 7.4–10.4)
POTASSIUM SERPL-SCNC: 4.7 MMOL/L (ref 3.5–5.1)
PROT ?TM UR-MCNC: (no result) MG/DL
RBC # BLD AUTO: 4.33 M/MM3 (ref 4.2–5.6)
RBC UR QL AUTO: NEGATIVE
SODIUM SERPL-SCNC: 142 MMOL/L (ref 136–145)
SP GR UR STRIP.AUTO: 1.02 (ref 1–1.03)
UROBILINOGEN UR-MCNC: NORMAL MG/DL
WBC # BLD AUTO: 6.7 K/MM3 (ref 4.8–10.8)
WBC #/AREA URNS HPF: (no result) /HPF (ref 0–3)

## 2022-01-05 ENCOUNTER — HOSPITAL ENCOUNTER (EMERGENCY)
Dept: HOSPITAL 6 - ED | Age: 82
Discharge: HOME | End: 2022-01-05
Payer: MEDICARE

## 2022-01-05 VITALS — SYSTOLIC BLOOD PRESSURE: 157 MMHG | DIASTOLIC BLOOD PRESSURE: 69 MMHG

## 2022-01-05 DIAGNOSIS — E11.40: ICD-10-CM

## 2022-01-05 DIAGNOSIS — L03.115: ICD-10-CM

## 2022-01-05 DIAGNOSIS — Z79.84: ICD-10-CM

## 2022-01-05 DIAGNOSIS — I10: ICD-10-CM

## 2022-01-05 DIAGNOSIS — R60.0: ICD-10-CM

## 2022-01-05 DIAGNOSIS — Z79.82: ICD-10-CM

## 2022-01-05 DIAGNOSIS — J44.9: ICD-10-CM

## 2022-01-05 DIAGNOSIS — Z79.899: ICD-10-CM

## 2022-01-05 DIAGNOSIS — L03.116: Primary | ICD-10-CM

## 2022-01-05 DIAGNOSIS — Z87.891: ICD-10-CM

## 2022-01-05 DIAGNOSIS — E66.01: ICD-10-CM

## 2022-01-05 LAB
ALBUMIN SERPL-MCNC: 3.7 G/DL (ref 3.4–4.8)
ALT SERPL-CCNC: 10 U/L (ref 0–55)
AST SERPL-CCNC: 14 U/L (ref 5–34)
BASOPHILS # BLD: 0.05 K/MM3 (ref 0.02–0.1)
BILIRUB SERPL-MCNC: 0.4 MG/DL (ref 0.2–1.2)
CALCIUM SERPL-MCNC: 9.6 MG/DL (ref 8.3–10.5)
CO2 SERPL-SCNC: 28 MMOL/L (ref 23–31)
EOSINOPHIL # BLD: 0.39 K/MM3 (ref 0.04–0.4)
EOSINOPHIL NFR BLD: 5.5 % (ref 0–4)
ERYTHROCYTE [DISTWIDTH] IN BLOOD BY AUTOMATED COUNT: 15 % (ref 11.5–14.5)
GLUCOSE SERPL-MCNC: 190 MG/DL (ref 75–110)
HCT VFR BLD AUTO: 41.9 % (ref 42–52)
HGB BLD-MCNC: 12.5 G/DL (ref 13.5–18)
LYMPHOCYTES # BLD: 1.36 K/MM3 (ref 1.5–4)
MCH RBC QN AUTO: 29 PG (ref 27–31)
MCHC RBC AUTO-ENTMCNC: 30 G/DL (ref 33–37)
MCV RBC AUTO: 95 FL (ref 78–100)
MONOCYTES # BLD: 0.65 K/MM3 (ref 0.2–0.8)
NEUTROPHILS # BLD: 4.67 K/MM3 (ref 1.4–6.5)
PLATELET # BLD AUTO: 213 K/MM3 (ref 130–400)
PMV BLD AUTO: 9.2 FL (ref 7.4–10.4)
POTASSIUM SERPL-SCNC: 5.1 MMOL/L (ref 3.5–5.1)
PROT SERPL-MCNC: 8.3 G/DL (ref 6.2–8.1)
RBC # BLD AUTO: 4.39 M/MM3 (ref 4.2–5.6)
SODIUM SERPL-SCNC: 143 MMOL/L (ref 136–145)
WBC # BLD AUTO: 7.1 K/MM3 (ref 4.8–10.8)

## 2022-01-07 ENCOUNTER — HOSPITAL ENCOUNTER (OUTPATIENT)
Dept: HOSPITAL 6 - LAB | Age: 82
End: 2022-01-07
Attending: FAMILY MEDICINE
Payer: MEDICARE

## 2022-01-07 DIAGNOSIS — N18.9: Primary | ICD-10-CM

## 2022-01-07 LAB
CALCIUM SERPL-MCNC: 9.7 MG/DL (ref 8.3–10.5)
CO2 SERPL-SCNC: 26 MMOL/L (ref 23–31)
GLUCOSE SERPL-MCNC: 106 MG/DL (ref 75–110)
POTASSIUM SERPL-SCNC: 4.8 MMOL/L (ref 3.5–5.1)
SODIUM SERPL-SCNC: 143 MMOL/L (ref 136–145)